# Patient Record
Sex: MALE | Race: BLACK OR AFRICAN AMERICAN | NOT HISPANIC OR LATINO | URBAN - METROPOLITAN AREA
[De-identification: names, ages, dates, MRNs, and addresses within clinical notes are randomized per-mention and may not be internally consistent; named-entity substitution may affect disease eponyms.]

---

## 2017-08-01 ENCOUNTER — EMERGENCY (EMERGENCY)
Facility: HOSPITAL | Age: 52
LOS: 1 days | Discharge: HOME | End: 2017-08-01
Attending: EMERGENCY MEDICINE | Admitting: EMERGENCY MEDICINE
Payer: SELF-PAY

## 2017-08-01 VITALS
TEMPERATURE: 98 F | OXYGEN SATURATION: 96 % | SYSTOLIC BLOOD PRESSURE: 137 MMHG | HEART RATE: 84 BPM | RESPIRATION RATE: 17 BRPM | DIASTOLIC BLOOD PRESSURE: 82 MMHG

## 2017-08-01 DIAGNOSIS — S83.92XA SPRAIN OF UNSPECIFIED SITE OF LEFT KNEE, INITIAL ENCOUNTER: ICD-10-CM
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
## 2017-08-01 DIAGNOSIS — M25.562 PAIN IN LEFT KNEE: ICD-10-CM

## 2017-08-01 PROCEDURE — 99283 EMERGENCY DEPT VISIT LOW MDM: CPT

## 2017-08-01 PROCEDURE — 73562 X-RAY EXAM OF KNEE 3: CPT | Mod: 26,LT

## 2017-08-01 NOTE — ED PROVIDER NOTE - MEDICAL DECISION MAKING DETAILS
knee sprain, Left, patient works for [employer], lifted heavy object, refer to ortho, knee immob, xray no acute fx noted on wet read

## 2017-11-29 ENCOUNTER — OUTPATIENT (OUTPATIENT)
Dept: OUTPATIENT SERVICES | Facility: HOSPITAL | Age: 52
LOS: 1 days | End: 2017-11-29
Payer: COMMERCIAL

## 2017-11-29 DIAGNOSIS — Z22.321 CARRIER OR SUSPECTED CARRIER OF METHICILLIN SUSCEPTIBLE STAPHYLOCOCCUS AUREUS: ICD-10-CM

## 2017-11-29 LAB
MRSA PCR RESULT.: NEGATIVE — SIGNIFICANT CHANGE UP
S AUREUS DNA NOSE QL NAA+PROBE: NEGATIVE — SIGNIFICANT CHANGE UP

## 2017-11-29 PROCEDURE — 87641 MR-STAPH DNA AMP PROBE: CPT

## 2017-12-08 VITALS
HEIGHT: 66 IN | HEART RATE: 72 BPM | SYSTOLIC BLOOD PRESSURE: 117 MMHG | TEMPERATURE: 98 F | WEIGHT: 192.02 LBS | DIASTOLIC BLOOD PRESSURE: 58 MMHG | RESPIRATION RATE: 18 BRPM | OXYGEN SATURATION: 100 %

## 2017-12-09 ENCOUNTER — INPATIENT (INPATIENT)
Facility: HOSPITAL | Age: 52
LOS: 1 days | Discharge: HOME HEALTH | DRG: 470 | End: 2017-12-11
Attending: ORTHOPAEDIC SURGERY | Admitting: ORTHOPAEDIC SURGERY
Payer: OTHER MISCELLANEOUS

## 2017-12-09 ENCOUNTER — RESULT REVIEW (OUTPATIENT)
Age: 52
End: 2017-12-09

## 2017-12-09 DIAGNOSIS — Z41.9 ENCOUNTER FOR PROCEDURE FOR PURPOSES OTHER THAN REMEDYING HEALTH STATE, UNSPECIFIED: Chronic | ICD-10-CM

## 2017-12-09 LAB
ALBUMIN SERPL ELPH-MCNC: 4.4 G/DL — SIGNIFICANT CHANGE UP (ref 3.3–5)
ALP SERPL-CCNC: 56 U/L — SIGNIFICANT CHANGE UP (ref 40–120)
ALT FLD-CCNC: 18 U/L — SIGNIFICANT CHANGE UP (ref 10–45)
ANION GAP SERPL CALC-SCNC: 11 MMOL/L — SIGNIFICANT CHANGE UP (ref 5–17)
APTT BLD: 29.2 SEC — SIGNIFICANT CHANGE UP (ref 27.5–37.4)
AST SERPL-CCNC: 15 U/L — SIGNIFICANT CHANGE UP (ref 10–40)
BASOPHILS NFR BLD AUTO: 0.2 % — SIGNIFICANT CHANGE UP (ref 0–2)
BILIRUB SERPL-MCNC: 0.3 MG/DL — SIGNIFICANT CHANGE UP (ref 0.2–1.2)
BLD GP AB SCN SERPL QL: NEGATIVE — SIGNIFICANT CHANGE UP
BUN SERPL-MCNC: 16 MG/DL — SIGNIFICANT CHANGE UP (ref 7–23)
CALCIUM SERPL-MCNC: 9.6 MG/DL — SIGNIFICANT CHANGE UP (ref 8.4–10.5)
CHLORIDE SERPL-SCNC: 101 MMOL/L — SIGNIFICANT CHANGE UP (ref 96–108)
CO2 SERPL-SCNC: 26 MMOL/L — SIGNIFICANT CHANGE UP (ref 22–31)
CREAT SERPL-MCNC: 0.92 MG/DL — SIGNIFICANT CHANGE UP (ref 0.5–1.3)
EOSINOPHIL NFR BLD AUTO: 1.4 % — SIGNIFICANT CHANGE UP (ref 0–6)
GLUCOSE BLDC GLUCOMTR-MCNC: 100 MG/DL — HIGH (ref 70–99)
GLUCOSE BLDC GLUCOMTR-MCNC: 122 MG/DL — HIGH (ref 70–99)
GLUCOSE BLDC GLUCOMTR-MCNC: 158 MG/DL — HIGH (ref 70–99)
GLUCOSE BLDC GLUCOMTR-MCNC: 162 MG/DL — HIGH (ref 70–99)
GLUCOSE BLDC GLUCOMTR-MCNC: 96 MG/DL — SIGNIFICANT CHANGE UP (ref 70–99)
GLUCOSE SERPL-MCNC: 140 MG/DL — HIGH (ref 70–99)
HCT VFR BLD CALC: 39 % — SIGNIFICANT CHANGE UP (ref 39–50)
HGB BLD-MCNC: 12.9 G/DL — LOW (ref 13–17)
INR BLD: 1.01 — SIGNIFICANT CHANGE UP (ref 0.88–1.16)
LYMPHOCYTES # BLD AUTO: 42.3 % — SIGNIFICANT CHANGE UP (ref 13–44)
MCHC RBC-ENTMCNC: 28.2 PG — SIGNIFICANT CHANGE UP (ref 27–34)
MCHC RBC-ENTMCNC: 33.1 G/DL — SIGNIFICANT CHANGE UP (ref 32–36)
MCV RBC AUTO: 85.2 FL — SIGNIFICANT CHANGE UP (ref 80–100)
MONOCYTES NFR BLD AUTO: 5.8 % — SIGNIFICANT CHANGE UP (ref 2–14)
NEUTROPHILS NFR BLD AUTO: 50.3 % — SIGNIFICANT CHANGE UP (ref 43–77)
PLATELET # BLD AUTO: 242 K/UL — SIGNIFICANT CHANGE UP (ref 150–400)
POTASSIUM SERPL-MCNC: 4.5 MMOL/L — SIGNIFICANT CHANGE UP (ref 3.5–5.3)
POTASSIUM SERPL-SCNC: 4.5 MMOL/L — SIGNIFICANT CHANGE UP (ref 3.5–5.3)
PROT SERPL-MCNC: 7.4 G/DL — SIGNIFICANT CHANGE UP (ref 6–8.3)
PROTHROM AB SERPL-ACNC: 11.2 SEC — SIGNIFICANT CHANGE UP (ref 9.8–12.7)
RBC # BLD: 4.58 M/UL — SIGNIFICANT CHANGE UP (ref 4.2–5.8)
RBC # FLD: 13.8 % — SIGNIFICANT CHANGE UP (ref 10.3–16.9)
RH IG SCN BLD-IMP: NEGATIVE — SIGNIFICANT CHANGE UP
SODIUM SERPL-SCNC: 138 MMOL/L — SIGNIFICANT CHANGE UP (ref 135–145)
WBC # BLD: 5.9 K/UL — SIGNIFICANT CHANGE UP (ref 3.8–10.5)
WBC # FLD AUTO: 5.9 K/UL — SIGNIFICANT CHANGE UP (ref 3.8–10.5)

## 2017-12-09 PROCEDURE — 73560 X-RAY EXAM OF KNEE 1 OR 2: CPT | Mod: 26,LT

## 2017-12-09 RX ORDER — ONDANSETRON 8 MG/1
4 TABLET, FILM COATED ORAL EVERY 6 HOURS
Qty: 0 | Refills: 0 | Status: DISCONTINUED | OUTPATIENT
Start: 2017-12-09 | End: 2017-12-11

## 2017-12-09 RX ORDER — DIAZEPAM 5 MG
5 TABLET ORAL EVERY 4 HOURS
Qty: 0 | Refills: 0 | Status: DISCONTINUED | OUTPATIENT
Start: 2017-12-09 | End: 2017-12-11

## 2017-12-09 RX ORDER — ACETAMINOPHEN 500 MG
1000 TABLET ORAL ONCE
Qty: 0 | Refills: 0 | Status: COMPLETED | OUTPATIENT
Start: 2017-12-09 | End: 2017-12-09

## 2017-12-09 RX ORDER — ACETAMINOPHEN 500 MG
650 TABLET ORAL EVERY 6 HOURS
Qty: 0 | Refills: 0 | Status: DISCONTINUED | OUTPATIENT
Start: 2017-12-09 | End: 2017-12-11

## 2017-12-09 RX ORDER — MORPHINE SULFATE 50 MG/1
4 CAPSULE, EXTENDED RELEASE ORAL ONCE
Qty: 0 | Refills: 0 | Status: DISCONTINUED | OUTPATIENT
Start: 2017-12-09 | End: 2017-12-09

## 2017-12-09 RX ORDER — POLYETHYLENE GLYCOL 3350 17 G/17G
17 POWDER, FOR SOLUTION ORAL DAILY
Qty: 0 | Refills: 0 | Status: DISCONTINUED | OUTPATIENT
Start: 2017-12-09 | End: 2017-12-11

## 2017-12-09 RX ORDER — HYDROMORPHONE HYDROCHLORIDE 2 MG/ML
1 INJECTION INTRAMUSCULAR; INTRAVENOUS; SUBCUTANEOUS ONCE
Qty: 0 | Refills: 0 | Status: DISCONTINUED | OUTPATIENT
Start: 2017-12-09 | End: 2017-12-09

## 2017-12-09 RX ORDER — SODIUM CHLORIDE 9 MG/ML
1000 INJECTION, SOLUTION INTRAVENOUS
Qty: 0 | Refills: 0 | Status: DISCONTINUED | OUTPATIENT
Start: 2017-12-09 | End: 2017-12-11

## 2017-12-09 RX ORDER — GLUCAGON INJECTION, SOLUTION 0.5 MG/.1ML
1 INJECTION, SOLUTION SUBCUTANEOUS ONCE
Qty: 0 | Refills: 0 | Status: DISCONTINUED | OUTPATIENT
Start: 2017-12-09 | End: 2017-12-11

## 2017-12-09 RX ORDER — INSULIN LISPRO 100/ML
VIAL (ML) SUBCUTANEOUS
Qty: 0 | Refills: 0 | Status: DISCONTINUED | OUTPATIENT
Start: 2017-12-09 | End: 2017-12-11

## 2017-12-09 RX ORDER — INSULIN GLARGINE 100 [IU]/ML
30 INJECTION, SOLUTION SUBCUTANEOUS AT BEDTIME
Qty: 0 | Refills: 0 | Status: DISCONTINUED | OUTPATIENT
Start: 2017-12-09 | End: 2017-12-11

## 2017-12-09 RX ORDER — OXYCODONE HYDROCHLORIDE 5 MG/1
10 TABLET ORAL EVERY 4 HOURS
Qty: 0 | Refills: 0 | Status: DISCONTINUED | OUTPATIENT
Start: 2017-12-09 | End: 2017-12-11

## 2017-12-09 RX ORDER — MORPHINE SULFATE 50 MG/1
2 CAPSULE, EXTENDED RELEASE ORAL EVERY 4 HOURS
Qty: 0 | Refills: 0 | Status: DISCONTINUED | OUTPATIENT
Start: 2017-12-09 | End: 2017-12-11

## 2017-12-09 RX ORDER — HYDROMORPHONE HYDROCHLORIDE 2 MG/ML
0.5 INJECTION INTRAMUSCULAR; INTRAVENOUS; SUBCUTANEOUS ONCE
Qty: 0 | Refills: 0 | Status: DISCONTINUED | OUTPATIENT
Start: 2017-12-09 | End: 2017-12-09

## 2017-12-09 RX ORDER — DEXTROSE 50 % IN WATER 50 %
25 SYRINGE (ML) INTRAVENOUS ONCE
Qty: 0 | Refills: 0 | Status: DISCONTINUED | OUTPATIENT
Start: 2017-12-09 | End: 2017-12-11

## 2017-12-09 RX ORDER — DEXTROSE 50 % IN WATER 50 %
12.5 SYRINGE (ML) INTRAVENOUS ONCE
Qty: 0 | Refills: 0 | Status: DISCONTINUED | OUTPATIENT
Start: 2017-12-09 | End: 2017-12-11

## 2017-12-09 RX ORDER — CELECOXIB 200 MG/1
200 CAPSULE ORAL
Qty: 0 | Refills: 0 | Status: DISCONTINUED | OUTPATIENT
Start: 2017-12-11 | End: 2017-12-11

## 2017-12-09 RX ORDER — ASPIRIN/CALCIUM CARB/MAGNESIUM 324 MG
325 TABLET ORAL
Qty: 0 | Refills: 0 | Status: DISCONTINUED | OUTPATIENT
Start: 2017-12-09 | End: 2017-12-11

## 2017-12-09 RX ORDER — SIMVASTATIN 20 MG/1
20 TABLET, FILM COATED ORAL AT BEDTIME
Qty: 0 | Refills: 0 | Status: DISCONTINUED | OUTPATIENT
Start: 2017-12-09 | End: 2017-12-11

## 2017-12-09 RX ORDER — SENNA PLUS 8.6 MG/1
2 TABLET ORAL AT BEDTIME
Qty: 0 | Refills: 0 | Status: DISCONTINUED | OUTPATIENT
Start: 2017-12-09 | End: 2017-12-11

## 2017-12-09 RX ORDER — MAGNESIUM HYDROXIDE 400 MG/1
30 TABLET, CHEWABLE ORAL DAILY
Qty: 0 | Refills: 0 | Status: DISCONTINUED | OUTPATIENT
Start: 2017-12-09 | End: 2017-12-11

## 2017-12-09 RX ORDER — METFORMIN HYDROCHLORIDE 850 MG/1
1000 TABLET ORAL
Qty: 0 | Refills: 0 | Status: DISCONTINUED | OUTPATIENT
Start: 2017-12-09 | End: 2017-12-09

## 2017-12-09 RX ORDER — BUPIVACAINE 13.3 MG/ML
20 INJECTION, SUSPENSION, LIPOSOMAL INFILTRATION ONCE
Qty: 0 | Refills: 0 | Status: DISCONTINUED | OUTPATIENT
Start: 2017-12-09 | End: 2017-12-11

## 2017-12-09 RX ORDER — OXYCODONE HYDROCHLORIDE 5 MG/1
5 TABLET ORAL EVERY 4 HOURS
Qty: 0 | Refills: 0 | Status: DISCONTINUED | OUTPATIENT
Start: 2017-12-09 | End: 2017-12-11

## 2017-12-09 RX ORDER — DOCUSATE SODIUM 100 MG
100 CAPSULE ORAL THREE TIMES A DAY
Qty: 0 | Refills: 0 | Status: DISCONTINUED | OUTPATIENT
Start: 2017-12-09 | End: 2017-12-11

## 2017-12-09 RX ORDER — CEFAZOLIN SODIUM 1 G
2000 VIAL (EA) INJECTION EVERY 8 HOURS
Qty: 0 | Refills: 0 | Status: COMPLETED | OUTPATIENT
Start: 2017-12-09 | End: 2017-12-09

## 2017-12-09 RX ORDER — OXYCODONE HYDROCHLORIDE 5 MG/1
10 TABLET ORAL EVERY 12 HOURS
Qty: 0 | Refills: 0 | Status: DISCONTINUED | OUTPATIENT
Start: 2017-12-09 | End: 2017-12-11

## 2017-12-09 RX ORDER — DEXTROSE 50 % IN WATER 50 %
1 SYRINGE (ML) INTRAVENOUS ONCE
Qty: 0 | Refills: 0 | Status: DISCONTINUED | OUTPATIENT
Start: 2017-12-09 | End: 2017-12-11

## 2017-12-09 RX ADMIN — Medication 400 MILLIGRAM(S): at 14:48

## 2017-12-09 RX ADMIN — MORPHINE SULFATE 4 MILLIGRAM(S): 50 CAPSULE, EXTENDED RELEASE ORAL at 14:04

## 2017-12-09 RX ADMIN — HYDROMORPHONE HYDROCHLORIDE 0.5 MILLIGRAM(S): 2 INJECTION INTRAMUSCULAR; INTRAVENOUS; SUBCUTANEOUS at 14:45

## 2017-12-09 RX ADMIN — Medication: at 17:18

## 2017-12-09 RX ADMIN — HYDROMORPHONE HYDROCHLORIDE 0.5 MILLIGRAM(S): 2 INJECTION INTRAMUSCULAR; INTRAVENOUS; SUBCUTANEOUS at 16:02

## 2017-12-09 RX ADMIN — HYDROMORPHONE HYDROCHLORIDE 0.5 MILLIGRAM(S): 2 INJECTION INTRAMUSCULAR; INTRAVENOUS; SUBCUTANEOUS at 14:55

## 2017-12-09 RX ADMIN — HYDROMORPHONE HYDROCHLORIDE 0.5 MILLIGRAM(S): 2 INJECTION INTRAMUSCULAR; INTRAVENOUS; SUBCUTANEOUS at 14:37

## 2017-12-09 RX ADMIN — Medication 100 MILLIGRAM(S): at 17:56

## 2017-12-09 RX ADMIN — Medication 100 MILLIGRAM(S): at 21:36

## 2017-12-09 RX ADMIN — HYDROMORPHONE HYDROCHLORIDE 0.5 MILLIGRAM(S): 2 INJECTION INTRAMUSCULAR; INTRAVENOUS; SUBCUTANEOUS at 15:19

## 2017-12-09 RX ADMIN — Medication 650 MILLIGRAM(S): at 23:11

## 2017-12-09 RX ADMIN — HYDROMORPHONE HYDROCHLORIDE 1 MILLIGRAM(S): 2 INJECTION INTRAMUSCULAR; INTRAVENOUS; SUBCUTANEOUS at 16:52

## 2017-12-09 RX ADMIN — OXYCODONE HYDROCHLORIDE 10 MILLIGRAM(S): 5 TABLET ORAL at 19:01

## 2017-12-09 RX ADMIN — Medication 650 MILLIGRAM(S): at 17:57

## 2017-12-09 RX ADMIN — Medication 650 MILLIGRAM(S): at 18:30

## 2017-12-09 RX ADMIN — Medication: at 12:24

## 2017-12-09 RX ADMIN — SIMVASTATIN 20 MILLIGRAM(S): 20 TABLET, FILM COATED ORAL at 21:36

## 2017-12-09 RX ADMIN — HYDROMORPHONE HYDROCHLORIDE 1 MILLIGRAM(S): 2 INJECTION INTRAMUSCULAR; INTRAVENOUS; SUBCUTANEOUS at 17:38

## 2017-12-09 RX ADMIN — SODIUM CHLORIDE 100 MILLILITER(S): 9 INJECTION, SOLUTION INTRAVENOUS at 12:45

## 2017-12-09 RX ADMIN — OXYCODONE HYDROCHLORIDE 10 MILLIGRAM(S): 5 TABLET ORAL at 17:52

## 2017-12-09 RX ADMIN — Medication 100 MILLIGRAM(S): at 23:12

## 2017-12-09 RX ADMIN — INSULIN GLARGINE 30 UNIT(S): 100 INJECTION, SOLUTION SUBCUTANEOUS at 21:37

## 2017-12-09 RX ADMIN — Medication 100 MILLIGRAM(S): at 16:13

## 2017-12-09 RX ADMIN — Medication 1000 MILLIGRAM(S): at 15:28

## 2017-12-09 RX ADMIN — Medication 650 MILLIGRAM(S): at 23:45

## 2017-12-09 RX ADMIN — Medication 5 MILLIGRAM(S): at 19:00

## 2017-12-09 RX ADMIN — Medication 2: at 21:36

## 2017-12-09 RX ADMIN — Medication 325 MILLIGRAM(S): at 17:56

## 2017-12-09 RX ADMIN — OXYCODONE HYDROCHLORIDE 10 MILLIGRAM(S): 5 TABLET ORAL at 21:47

## 2017-12-09 RX ADMIN — HYDROMORPHONE HYDROCHLORIDE 0.5 MILLIGRAM(S): 2 INJECTION INTRAMUSCULAR; INTRAVENOUS; SUBCUTANEOUS at 16:13

## 2017-12-09 RX ADMIN — OXYCODONE HYDROCHLORIDE 10 MILLIGRAM(S): 5 TABLET ORAL at 20:47

## 2017-12-09 RX ADMIN — MORPHINE SULFATE 4 MILLIGRAM(S): 50 CAPSULE, EXTENDED RELEASE ORAL at 14:16

## 2017-12-09 NOTE — PROGRESS NOTE ADULT - SUBJECTIVE AND OBJECTIVE BOX
Orthopaedics Post Op Check    Procedure: L TKA  Surgeon: Geremias    Pt comfortable, without complaints  Denies CP, SOB, N/V, numbness/tingling     Vital Signs Last 24 Hrs  T(C): 36.8 (09 Dec 2017 20:39), Max: 37.2 (09 Dec 2017 14:50)  T(F): 98.3 (09 Dec 2017 20:39), Max: 99 (09 Dec 2017 14:50)  HR: 79 (09 Dec 2017 20:39) (66 - 86)  BP: 150/74 (09 Dec 2017 20:39) (97/62 - 168/84)  BP(mean): 102 (09 Dec 2017 17:10) (76 - 117)  RR: 16 (09 Dec 2017 20:39) (10 - 27)  SpO2: 95% (09 Dec 2017 20:39) (95% - 100%)  AVSS, NAD    Dressing C/D/I  General: Pt Alert and oriented     Pulses: WWP, DP/PT 2+  Sensation: SILT  Motor: 5/5 EHL/FHL/TA/GS                          12.9   5.9   )-----------( 242      ( 09 Dec 2017 07:56 )             39.0   12-09    138  |  101  |  16  ----------------------------<  140<H>  4.5   |  26  |  0.92    Ca    9.6      09 Dec 2017 07:56    TPro  7.4  /  Alb  4.4  /  TBili  0.3  /  DBili  x   /  AST  15  /  ALT  18  /  AlkPhos  56  12-09    Post op XR: no fracture or foreign body    A/P: 52yMale POD#0 s/p above procedure  - Stable  - Pain Control  - DVT ppx: ASA  - Post op abx: Ancef  - PT, WBS: WBAT  - F/U AM Labs    Doni Colon MD, PGY-2  [phone]

## 2017-12-09 NOTE — PHYSICAL THERAPY INITIAL EVALUATION ADULT - ADDITIONAL COMMENTS
straight cane and rollator home, educated on the needs for rolling walker for safety, 15 lambert with right hand rail ascending lives with wife

## 2017-12-09 NOTE — H&P ADULT - PMH
Hyperlipidemia, unspecified hyperlipidemia type    Type 2 diabetes mellitus without complication, unspecified long term insulin use status

## 2017-12-09 NOTE — PHYSICAL THERAPY INITIAL EVALUATION ADULT - IMPAIRMENTS CONTRIBUTING TO GAIT DEVIATIONS, PT EVAL
narrow base of support/pain/decreased heel strike LLE ~20 degrees knee flexion, substituted by crouched posture when foot flat on the floor/decreased strength

## 2017-12-09 NOTE — PHYSICAL THERAPY INITIAL EVALUATION ADULT - CRITERIA FOR SKILLED THERAPEUTIC INTERVENTIONS
impairments found/rehab potential/anticipated equipment needs at discharge/anticipated discharge recommendation/functional limitations in following categories

## 2017-12-09 NOTE — H&P ADULT - HISTORY OF PRESENT ILLNESS
52 M with L knee pain attributable to OA. Patient sustained an injury on 08/01/17. Comes in today for elective L TKA.

## 2017-12-09 NOTE — PHYSICAL THERAPY INITIAL EVALUATION ADULT - PERTINENT HX OF CURRENT PROBLEM, REHAB EVAL
53 yo male with left knee Degenerative Joint Disease s/p elective left Total Knee Arthroplasty seen for PT eval POD # 0 for fast track

## 2017-12-09 NOTE — H&P ADULT - NSHPPHYSICALEXAM_GEN_ALL_CORE
LLE    WWP, BCR  DP/PT  Motor: EHL/FHL/GS/AT  Sensory: DP/SP/AT, MP/LP/S/S SILT  ROM: 0-95  Deformity: no obvious deformity

## 2017-12-09 NOTE — H&P ADULT - FAMILY HISTORY
Father  Still living? Unknown  Family history of diabetes mellitus, Age at diagnosis: Age Unknown     Mother  Still living? Unknown  Family history of chronic ischemic heart disease, Age at diagnosis: Age Unknown

## 2017-12-09 NOTE — BRIEF OPERATIVE NOTE - PROCEDURE
<<-----Click on this checkbox to enter Procedure Total knee arthroplasty  12/09/2017    Active  [facility]

## 2017-12-09 NOTE — PHYSICAL THERAPY INITIAL EVALUATION ADULT - PLANNED THERAPY INTERVENTIONS, PT EVAL
transfer training/postural re-education/stair negotiation assessment/ROM/gait training/strengthening/balance training/bed mobility training

## 2017-12-09 NOTE — PHYSICAL THERAPY INITIAL EVALUATION ADULT - RANGE OF MOTION EXAMINATION, REHAB EVAL
Left knee ~20 degrees-45 degrees limited by pain/Right LE ROM was WFL (within functional limits)/bilateral upper extremity ROM was WNL (within normal limits)

## 2017-12-10 LAB
ANION GAP SERPL CALC-SCNC: 13 MMOL/L — SIGNIFICANT CHANGE UP (ref 5–17)
BUN SERPL-MCNC: 11 MG/DL — SIGNIFICANT CHANGE UP (ref 7–23)
CALCIUM SERPL-MCNC: 8.9 MG/DL — SIGNIFICANT CHANGE UP (ref 8.4–10.5)
CHLORIDE SERPL-SCNC: 98 MMOL/L — SIGNIFICANT CHANGE UP (ref 96–108)
CO2 SERPL-SCNC: 25 MMOL/L — SIGNIFICANT CHANGE UP (ref 22–31)
CREAT SERPL-MCNC: 0.84 MG/DL — SIGNIFICANT CHANGE UP (ref 0.5–1.3)
GLUCOSE BLDC GLUCOMTR-MCNC: 142 MG/DL — HIGH (ref 70–99)
GLUCOSE BLDC GLUCOMTR-MCNC: 145 MG/DL — HIGH (ref 70–99)
GLUCOSE BLDC GLUCOMTR-MCNC: 180 MG/DL — HIGH (ref 70–99)
GLUCOSE BLDC GLUCOMTR-MCNC: 202 MG/DL — HIGH (ref 70–99)
GLUCOSE SERPL-MCNC: 137 MG/DL — HIGH (ref 70–99)
HCT VFR BLD CALC: 35.4 % — LOW (ref 39–50)
HGB BLD-MCNC: 11.4 G/DL — LOW (ref 13–17)
MCHC RBC-ENTMCNC: 27 PG — SIGNIFICANT CHANGE UP (ref 27–34)
MCHC RBC-ENTMCNC: 32.2 G/DL — SIGNIFICANT CHANGE UP (ref 32–36)
MCV RBC AUTO: 83.9 FL — SIGNIFICANT CHANGE UP (ref 80–100)
PLATELET # BLD AUTO: 232 K/UL — SIGNIFICANT CHANGE UP (ref 150–400)
POTASSIUM SERPL-MCNC: 3.9 MMOL/L — SIGNIFICANT CHANGE UP (ref 3.5–5.3)
POTASSIUM SERPL-SCNC: 3.9 MMOL/L — SIGNIFICANT CHANGE UP (ref 3.5–5.3)
RBC # BLD: 4.22 M/UL — SIGNIFICANT CHANGE UP (ref 4.2–5.8)
RBC # FLD: 13.6 % — SIGNIFICANT CHANGE UP (ref 10.3–16.9)
SODIUM SERPL-SCNC: 136 MMOL/L — SIGNIFICANT CHANGE UP (ref 135–145)
WBC # BLD: 7.3 K/UL — SIGNIFICANT CHANGE UP (ref 3.8–10.5)
WBC # FLD AUTO: 7.3 K/UL — SIGNIFICANT CHANGE UP (ref 3.8–10.5)

## 2017-12-10 RX ADMIN — INSULIN GLARGINE 30 UNIT(S): 100 INJECTION, SOLUTION SUBCUTANEOUS at 21:32

## 2017-12-10 RX ADMIN — Medication 4: at 21:31

## 2017-12-10 RX ADMIN — Medication 5 MILLIGRAM(S): at 17:36

## 2017-12-10 RX ADMIN — Medication 100 MILLIGRAM(S): at 21:31

## 2017-12-10 RX ADMIN — SIMVASTATIN 20 MILLIGRAM(S): 20 TABLET, FILM COATED ORAL at 21:31

## 2017-12-10 RX ADMIN — OXYCODONE HYDROCHLORIDE 10 MILLIGRAM(S): 5 TABLET ORAL at 19:30

## 2017-12-10 RX ADMIN — OXYCODONE HYDROCHLORIDE 10 MILLIGRAM(S): 5 TABLET ORAL at 16:23

## 2017-12-10 RX ADMIN — Medication 325 MILLIGRAM(S): at 17:34

## 2017-12-10 RX ADMIN — Medication 650 MILLIGRAM(S): at 13:20

## 2017-12-10 RX ADMIN — OXYCODONE HYDROCHLORIDE 10 MILLIGRAM(S): 5 TABLET ORAL at 08:48

## 2017-12-10 RX ADMIN — Medication 650 MILLIGRAM(S): at 17:35

## 2017-12-10 RX ADMIN — OXYCODONE HYDROCHLORIDE 10 MILLIGRAM(S): 5 TABLET ORAL at 00:03

## 2017-12-10 RX ADMIN — OXYCODONE HYDROCHLORIDE 10 MILLIGRAM(S): 5 TABLET ORAL at 06:00

## 2017-12-10 RX ADMIN — Medication 5 MILLIGRAM(S): at 13:19

## 2017-12-10 RX ADMIN — OXYCODONE HYDROCHLORIDE 10 MILLIGRAM(S): 5 TABLET ORAL at 21:46

## 2017-12-10 RX ADMIN — Medication 100 MILLIGRAM(S): at 13:20

## 2017-12-10 RX ADMIN — OXYCODONE HYDROCHLORIDE 10 MILLIGRAM(S): 5 TABLET ORAL at 01:03

## 2017-12-10 RX ADMIN — OXYCODONE HYDROCHLORIDE 10 MILLIGRAM(S): 5 TABLET ORAL at 12:50

## 2017-12-10 RX ADMIN — Medication 650 MILLIGRAM(S): at 17:34

## 2017-12-10 RX ADMIN — Medication 650 MILLIGRAM(S): at 12:31

## 2017-12-10 RX ADMIN — OXYCODONE HYDROCHLORIDE 10 MILLIGRAM(S): 5 TABLET ORAL at 19:05

## 2017-12-10 RX ADMIN — Medication 650 MILLIGRAM(S): at 07:00

## 2017-12-10 RX ADMIN — OXYCODONE HYDROCHLORIDE 10 MILLIGRAM(S): 5 TABLET ORAL at 04:10

## 2017-12-10 RX ADMIN — OXYCODONE HYDROCHLORIDE 10 MILLIGRAM(S): 5 TABLET ORAL at 16:50

## 2017-12-10 RX ADMIN — OXYCODONE HYDROCHLORIDE 10 MILLIGRAM(S): 5 TABLET ORAL at 22:46

## 2017-12-10 RX ADMIN — OXYCODONE HYDROCHLORIDE 10 MILLIGRAM(S): 5 TABLET ORAL at 12:28

## 2017-12-10 RX ADMIN — Medication 325 MILLIGRAM(S): at 06:00

## 2017-12-10 RX ADMIN — Medication 650 MILLIGRAM(S): at 06:00

## 2017-12-10 RX ADMIN — Medication 100 MILLIGRAM(S): at 06:00

## 2017-12-10 RX ADMIN — OXYCODONE HYDROCHLORIDE 10 MILLIGRAM(S): 5 TABLET ORAL at 09:15

## 2017-12-10 RX ADMIN — OXYCODONE HYDROCHLORIDE 10 MILLIGRAM(S): 5 TABLET ORAL at 05:10

## 2017-12-10 RX ADMIN — OXYCODONE HYDROCHLORIDE 10 MILLIGRAM(S): 5 TABLET ORAL at 07:00

## 2017-12-10 NOTE — PROGRESS NOTE ADULT - SUBJECTIVE AND OBJECTIVE BOX
Patient seen and examined at bedside.      S: No acute events overnight.   Pain tolerable.    Denies CP/SOB. Tolerating PO.  ROS unremarkable.    O: T(C): 36.7 (12-10-17 @ 05:46), Max: 37.2 (12-09-17 @ 14:50)  HR: 74 (12-10-17 @ 05:46) (66 - 86)  BP: 152/78 (12-10-17 @ 05:46) (97/62 - 168/84)  RR: 17 (12-10-17 @ 05:46) (10 - 27)  SpO2: 96% (12-10-17 @ 05:46) (95% - 100%)  Wt(kg): --    NAD, AOx3, non-labored respirations  Abd soft, NTND  Dressing CDI  Extremity soft, appropriate swelling and minimally TTP  foot warm and well perfused  SILT dP, sP, Sa, Mijares, T  firing TA/EHL/GS                          12.9   5.9   )-----------( 242      ( 09 Dec 2017 07:56 )             39.0       I&O's Detail    09 Dec 2017 07:01  -  10 Dec 2017 07:00  --------------------------------------------------------  IN:    IV PiggyBack: 50 mL    lactated ringers.: 2700 mL    Oral Fluid: 1080 mL  Total IN: 3830 mL    OUT:    Voided: 1600 mL  Total OUT: 1600 mL    Total NET: 2230 mL            A/P: 52y Male s/p L TKA, stable    - analgesia with bowel regimen  - WBAT with PT  - OOB ad broderick  - DVT ppx: aspirin enteric coated 325 milliGRAM(s) Oral two times a day  - ADAT  - f/u labs  - Dispo planning

## 2017-12-11 ENCOUNTER — TRANSCRIPTION ENCOUNTER (OUTPATIENT)
Age: 52
End: 2017-12-11

## 2017-12-11 VITALS
SYSTOLIC BLOOD PRESSURE: 126 MMHG | RESPIRATION RATE: 18 BRPM | OXYGEN SATURATION: 96 % | HEART RATE: 90 BPM | DIASTOLIC BLOOD PRESSURE: 82 MMHG | TEMPERATURE: 98 F

## 2017-12-11 LAB
GLUCOSE BLDC GLUCOMTR-MCNC: 124 MG/DL — HIGH (ref 70–99)
GLUCOSE BLDC GLUCOMTR-MCNC: 128 MG/DL — HIGH (ref 70–99)
GLUCOSE BLDC GLUCOMTR-MCNC: 172 MG/DL — HIGH (ref 70–99)

## 2017-12-11 RX ORDER — ASPIRIN/CALCIUM CARB/MAGNESIUM 324 MG
1 TABLET ORAL
Qty: 56 | Refills: 0
Start: 2017-12-11 | End: 2018-01-07

## 2017-12-11 RX ORDER — DOCUSATE SODIUM 100 MG
1 CAPSULE ORAL
Qty: 0 | Refills: 0 | COMMUNITY
Start: 2017-12-11

## 2017-12-11 RX ORDER — METFORMIN HYDROCHLORIDE 850 MG/1
1 TABLET ORAL
Qty: 0 | Refills: 0 | COMMUNITY

## 2017-12-11 RX ORDER — ASPIRIN/CALCIUM CARB/MAGNESIUM 324 MG
1 TABLET ORAL
Qty: 0 | Refills: 0 | COMMUNITY
Start: 2017-12-11

## 2017-12-11 RX ORDER — ASPIRIN/CALCIUM CARB/MAGNESIUM 324 MG
1 TABLET ORAL
Qty: 56 | Refills: 0 | OUTPATIENT
Start: 2017-12-11 | End: 2018-01-07

## 2017-12-11 RX ORDER — INSULIN GLARGINE 100 [IU]/ML
0 INJECTION, SOLUTION SUBCUTANEOUS
Qty: 0 | Refills: 0 | DISCHARGE
Start: 2017-12-11

## 2017-12-11 RX ORDER — CELECOXIB 200 MG/1
1 CAPSULE ORAL
Qty: 28 | Refills: 0 | OUTPATIENT
Start: 2017-12-11 | End: 2018-01-07

## 2017-12-11 RX ORDER — ASPIRIN/CALCIUM CARB/MAGNESIUM 324 MG
0 TABLET ORAL
Qty: 0 | Refills: 0 | DISCHARGE
Start: 2017-12-11 | End: 2018-01-07

## 2017-12-11 RX ORDER — CELECOXIB 200 MG/1
1 CAPSULE ORAL
Qty: 0 | Refills: 0 | COMMUNITY
Start: 2017-12-11

## 2017-12-11 RX ORDER — METFORMIN HYDROCHLORIDE 850 MG/1
1 TABLET ORAL
Qty: 0 | Refills: 0 | DISCHARGE
Start: 2017-12-11

## 2017-12-11 RX ORDER — CELECOXIB 200 MG/1
1 CAPSULE ORAL
Qty: 28 | Refills: 0
Start: 2017-12-11 | End: 2018-01-07

## 2017-12-11 RX ORDER — ASPIRIN/CALCIUM CARB/MAGNESIUM 324 MG
1 TABLET ORAL
Qty: 0 | Refills: 0 | DISCHARGE
Start: 2017-12-11 | End: 2018-01-07

## 2017-12-11 RX ORDER — INSULIN GLARGINE 100 [IU]/ML
0 INJECTION, SOLUTION SUBCUTANEOUS
Qty: 0 | Refills: 0 | COMMUNITY

## 2017-12-11 RX ORDER — ASPIRIN/CALCIUM CARB/MAGNESIUM 324 MG
1 TABLET ORAL
Qty: 0 | Refills: 0 | COMMUNITY

## 2017-12-11 RX ORDER — SENNA PLUS 8.6 MG/1
2 TABLET ORAL
Qty: 0 | Refills: 0 | DISCHARGE
Start: 2017-12-11

## 2017-12-11 RX ADMIN — Medication 650 MILLIGRAM(S): at 00:21

## 2017-12-11 RX ADMIN — OXYCODONE HYDROCHLORIDE 10 MILLIGRAM(S): 5 TABLET ORAL at 05:22

## 2017-12-11 RX ADMIN — POLYETHYLENE GLYCOL 3350 17 GRAM(S): 17 POWDER, FOR SOLUTION ORAL at 12:21

## 2017-12-11 RX ADMIN — OXYCODONE HYDROCHLORIDE 10 MILLIGRAM(S): 5 TABLET ORAL at 16:14

## 2017-12-11 RX ADMIN — CELECOXIB 200 MILLIGRAM(S): 200 CAPSULE ORAL at 10:17

## 2017-12-11 RX ADMIN — Medication 650 MILLIGRAM(S): at 12:20

## 2017-12-11 RX ADMIN — OXYCODONE HYDROCHLORIDE 10 MILLIGRAM(S): 5 TABLET ORAL at 10:22

## 2017-12-11 RX ADMIN — OXYCODONE HYDROCHLORIDE 10 MILLIGRAM(S): 5 TABLET ORAL at 15:14

## 2017-12-11 RX ADMIN — Medication 2: at 12:20

## 2017-12-11 RX ADMIN — CELECOXIB 200 MILLIGRAM(S): 200 CAPSULE ORAL at 09:22

## 2017-12-11 RX ADMIN — Medication 650 MILLIGRAM(S): at 08:35

## 2017-12-11 RX ADMIN — Medication 650 MILLIGRAM(S): at 13:20

## 2017-12-11 RX ADMIN — Medication 650 MILLIGRAM(S): at 05:22

## 2017-12-11 RX ADMIN — Medication 325 MILLIGRAM(S): at 05:22

## 2017-12-11 RX ADMIN — Medication 5 MILLIGRAM(S): at 00:22

## 2017-12-11 RX ADMIN — OXYCODONE HYDROCHLORIDE 10 MILLIGRAM(S): 5 TABLET ORAL at 09:22

## 2017-12-11 RX ADMIN — OXYCODONE HYDROCHLORIDE 10 MILLIGRAM(S): 5 TABLET ORAL at 06:22

## 2017-12-11 RX ADMIN — Medication 100 MILLIGRAM(S): at 05:22

## 2017-12-11 RX ADMIN — Medication 650 MILLIGRAM(S): at 01:21

## 2017-12-11 NOTE — DISCHARGE NOTE ADULT - MEDICATION SUMMARY - MEDICATIONS TO STOP TAKING
I will STOP taking the medications listed below when I get home from the hospital:    varenicline 1 mg oral tablet  -- 1 tab(s) by mouth 2 times a day

## 2017-12-11 NOTE — DISCHARGE NOTE ADULT - HOSPITAL COURSE
Admitted  Surgery L TKA 12/9/17  Marlena-op Antibiotics  Pain control  DVT prophylaxis  OOB/Physical Therapy

## 2017-12-11 NOTE — DISCHARGE NOTE ADULT - PATIENT PORTAL LINK FT
“You can access the Follow[facility] Patient Portal, offered by [facility], by registering with the following website: [email or web address]”

## 2017-12-11 NOTE — DISCHARGE NOTE ADULT - MEDICATION SUMMARY - MEDICATIONS TO TAKE
I will START or STAY ON the medications listed below when I get home from the hospital:    oxyCODONE-acetaminophen 5 mg-325 mg oral tablet  -- 1-2 tab(s) by mouth every 4-6 hours, As Needed -for moderate pain MDD:8  -- Caution federal law prohibits the transfer of this drug to any person other  than the person for whom it was prescribed.  May cause drowsiness.  Alcohol may intensify this effect.  Use care when operating dangerous machinery.  This prescription cannot be refilled.  This product contains acetaminophen.  Do not use  with any other product containing acetaminophen to prevent possible liver damage.  Using more of this medication than prescribed may cause serious breathing problems.    -- Indication: For Pain    aspirin 325 mg oral delayed release tablet  -- 1 tab(s) by mouth 2 times a day  -- Indication: For Clot prevention    celecoxib 200 mg oral capsule  -- 1 cap(s) by mouth once a day (before a meal)  -- Indication: For Inflammation    glipiZIDE 10 mg oral tablet  -- 1 tab(s) by mouth once a day  -- Indication: For Diabetes    insulin glargine 100 units/mL subcutaneous solution  --  subcutaneous   -- Indication: For Diabetes    Glucophage 1000 mg oral tablet  -- 1 tab(s) by mouth 2 times a day  -- Indication: For Diabetes    simvastatin 20 mg oral tablet  -- 1 tab(s) by mouth once a day (at bedtime)  -- Indication: For Cholesterol    docusate sodium 100 mg oral capsule  -- 1 cap(s) by mouth 3 times a day  -- Indication: For Constipation (OTC)    senna oral tablet  -- 2 tab(s) by mouth once a day (at bedtime), As needed, Constipation  -- Indication: For Constipation (OTC) I will START or STAY ON the medications listed below when I get home from the hospital:    celecoxib 200 mg oral capsule  -- 1 cap(s) by mouth once a day (before a meal)  -- Indication: For Inflammation    oxyCODONE-acetaminophen 5 mg-325 mg oral tablet  -- 1-2 tab(s) by mouth every 4-6 hours, As Needed -for moderate pain MDD:8  -- Caution federal law prohibits the transfer of this drug to any person other  than the person for whom it was prescribed.  May cause drowsiness.  Alcohol may intensify this effect.  Use care when operating dangerous machinery.  This prescription cannot be refilled.  This product contains acetaminophen.  Do not use  with any other product containing acetaminophen to prevent possible liver damage.  Using more of this medication than prescribed may cause serious breathing problems.    -- Indication: For Pain    aspirin 325 mg oral delayed release tablet  -- 1 tab(s) by mouth 2 times a day  -- Indication: For Clot prevention    glipiZIDE 10 mg oral tablet  -- 1 tab(s) by mouth once a day  -- Indication: For Diabetes    insulin glargine 100 units/mL subcutaneous solution  --  subcutaneous   -- Indication: For Diabetes    Glucophage 1000 mg oral tablet  -- 1 tab(s) by mouth 2 times a day  -- Indication: For Diabetes    simvastatin 20 mg oral tablet  -- 1 tab(s) by mouth once a day (at bedtime)  -- Indication: For Cholesterol    docusate sodium 100 mg oral capsule  -- 1 cap(s) by mouth 3 times a day  -- Indication: For Constipation (OTC)    senna oral tablet  -- 2 tab(s) by mouth once a day (at bedtime), As needed, Constipation  -- Indication: For Constipation (OTC)

## 2017-12-11 NOTE — DISCHARGE NOTE ADULT - ADDITIONAL INSTRUCTIONS
No strenuous activity, heavy lifting, driving or returning to work until cleared by MD.  You may shower - dressing is water-resistant, no soaking in bathtubs.  Remove dressing after post op day 5-7, then leave incision open to air. Keep incision clean and dry.  Try to have regular bowel movements, take stool softener or laxative if necessary.  May take Pepcid or Zantac for upset stomach.  May take Aleve or Naproxen instead of Meloxicam.  Swelling may travel all the way down leg to foot, this is normal and will subside in a few weeks.  Call to schedule an appt with Dr. Archuleta for follow up, if you have staples or sutures they will be removed in office.  Contact your doctor if you experience: fever greater than 101.5, chills, chest pain, difficulty breathing, redness or excessive drainage around the incision, other concerns.   Follow up with your primary care provider.

## 2017-12-11 NOTE — DISCHARGE NOTE ADULT - CARE PLAN
Principal Discharge DX:	Primary osteoarthritis of left knee  Goal:	Improvement after surgery.  Instructions for follow-up, activity and diet:	See below.

## 2017-12-11 NOTE — PROGRESS NOTE ADULT - SUBJECTIVE AND OBJECTIVE BOX
Patient seen and examined at bedside.      S: No acute events overnight.   Pain tolerable.    Denies CP/SOB. Tolerating PO.  ROS unremarkable.    O: T(C): 37.1 (12-11-17 @ 05:19), Max: 37.2 (12-10-17 @ 15:33)  HR: 80 (12-11-17 @ 05:19) (67 - 91)  BP: 133/79 (12-11-17 @ 05:19) (133/79 - 164/88)  RR: 16 (12-11-17 @ 05:19) (16 - 16)  SpO2: 98% (12-11-17 @ 05:19) (96% - 98%)  Wt(kg): --    NAD, AOx3, non-labored respirations  Abd soft, NTND  Dressing CDI  Extremity soft, appropriate swelling and minimally TTP  foot warm and well perfused  SILT dP, sP, Sa, Mijares, T  firing TA/EHL/GS                          11.4   7.3   )-----------( 232      ( 10 Dec 2017 08:31 )             35.4       I&O's Detail    10 Dec 2017 07:01  -  11 Dec 2017 07:00  --------------------------------------------------------  IN:    Oral Fluid: 720 mL  Total IN: 720 mL    OUT:    Voided: 3100 mL  Total OUT: 3100 mL    Total NET: -2380 mL            A/P: 52y Male s/p L TKA, stable, for home    - analgesia with bowel regimen  - WBAT with PT  - OOB ad broderick  - DVT ppx: aspirin enteric coated 325 milliGRAM(s) Oral two times a day  - ADAT  - f/u labs  - Dispo planning

## 2017-12-11 NOTE — DISCHARGE NOTE ADULT - CARE PROVIDER_API CALL
Po Archuleta), Orthopaedic Surgery  [address] [place] [address]  Phone: [phone]  Fax: [phone]

## 2017-12-12 LAB — SURGICAL PATHOLOGY STUDY: SIGNIFICANT CHANGE UP

## 2017-12-12 PROCEDURE — 97116 GAIT TRAINING THERAPY: CPT

## 2017-12-12 PROCEDURE — 88300 SURGICAL PATH GROSS: CPT

## 2017-12-12 PROCEDURE — C1776: CPT

## 2017-12-12 PROCEDURE — 86850 RBC ANTIBODY SCREEN: CPT

## 2017-12-12 PROCEDURE — 85610 PROTHROMBIN TIME: CPT

## 2017-12-12 PROCEDURE — 86900 BLOOD TYPING SEROLOGIC ABO: CPT

## 2017-12-12 PROCEDURE — 86901 BLOOD TYPING SEROLOGIC RH(D): CPT

## 2017-12-12 PROCEDURE — 85025 COMPLETE CBC W/AUTO DIFF WBC: CPT

## 2017-12-12 PROCEDURE — 82962 GLUCOSE BLOOD TEST: CPT

## 2017-12-12 PROCEDURE — 73560 X-RAY EXAM OF KNEE 1 OR 2: CPT

## 2017-12-12 PROCEDURE — 86923 COMPATIBILITY TEST ELECTRIC: CPT

## 2017-12-12 PROCEDURE — C1713: CPT

## 2017-12-12 PROCEDURE — 97110 THERAPEUTIC EXERCISES: CPT

## 2017-12-12 PROCEDURE — 36415 COLL VENOUS BLD VENIPUNCTURE: CPT

## 2017-12-12 PROCEDURE — 80048 BASIC METABOLIC PNL TOTAL CA: CPT

## 2017-12-12 PROCEDURE — 85730 THROMBOPLASTIN TIME PARTIAL: CPT

## 2017-12-12 PROCEDURE — 97161 PT EVAL LOW COMPLEX 20 MIN: CPT

## 2017-12-12 PROCEDURE — 85027 COMPLETE CBC AUTOMATED: CPT

## 2017-12-12 PROCEDURE — 80053 COMPREHEN METABOLIC PANEL: CPT

## 2017-12-13 DIAGNOSIS — Z72.0 TOBACCO USE: ICD-10-CM

## 2017-12-13 DIAGNOSIS — Z79.4 LONG TERM (CURRENT) USE OF INSULIN: ICD-10-CM

## 2017-12-13 DIAGNOSIS — E11.9 TYPE 2 DIABETES MELLITUS WITHOUT COMPLICATIONS: ICD-10-CM

## 2017-12-13 DIAGNOSIS — Z79.82 LONG TERM (CURRENT) USE OF ASPIRIN: ICD-10-CM

## 2017-12-13 DIAGNOSIS — E78.5 HYPERLIPIDEMIA, UNSPECIFIED: ICD-10-CM

## 2017-12-13 DIAGNOSIS — Z85.46 PERSONAL HISTORY OF MALIGNANT NEOPLASM OF PROSTATE: ICD-10-CM

## 2017-12-13 DIAGNOSIS — M17.12 UNILATERAL PRIMARY OSTEOARTHRITIS, LEFT KNEE: ICD-10-CM

## 2017-12-13 DIAGNOSIS — Z91.013 ALLERGY TO SEAFOOD: ICD-10-CM

## 2017-12-13 DIAGNOSIS — F14.99 COCAINE USE, UNSPECIFIED WITH UNSPECIFIED COCAINE-INDUCED DISORDER: ICD-10-CM

## 2017-12-13 DIAGNOSIS — Z98.890 OTHER SPECIFIED POSTPROCEDURAL STATES: ICD-10-CM

## 2017-12-13 DIAGNOSIS — Z28.21 IMMUNIZATION NOT CARRIED OUT BECAUSE OF PATIENT REFUSAL: ICD-10-CM

## 2018-02-15 PROBLEM — Z00.00 ENCOUNTER FOR PREVENTIVE HEALTH EXAMINATION: Status: ACTIVE | Noted: 2018-02-15

## 2018-02-16 ENCOUNTER — OUTPATIENT (OUTPATIENT)
Dept: OUTPATIENT SERVICES | Facility: HOSPITAL | Age: 53
LOS: 1 days | End: 2018-02-16
Payer: OTHER MISCELLANEOUS

## 2018-02-16 ENCOUNTER — APPOINTMENT (OUTPATIENT)
Dept: SURGERY | Facility: CLINIC | Age: 53
End: 2018-02-16

## 2018-02-16 DIAGNOSIS — Z01.818 ENCOUNTER FOR OTHER PREPROCEDURAL EXAMINATION: ICD-10-CM

## 2018-02-16 DIAGNOSIS — Z96.652 PRESENCE OF LEFT ARTIFICIAL KNEE JOINT: ICD-10-CM

## 2018-02-16 DIAGNOSIS — Z41.9 ENCOUNTER FOR PROCEDURE FOR PURPOSES OTHER THAN REMEDYING HEALTH STATE, UNSPECIFIED: Chronic | ICD-10-CM

## 2018-02-16 DIAGNOSIS — M25.662 STIFFNESS OF LEFT KNEE, NOT ELSEWHERE CLASSIFIED: ICD-10-CM

## 2018-02-16 PROCEDURE — 93010 ELECTROCARDIOGRAM REPORT: CPT

## 2018-02-19 VITALS
HEIGHT: 67 IN | OXYGEN SATURATION: 100 % | TEMPERATURE: 97 F | HEART RATE: 85 BPM | WEIGHT: 193.35 LBS | SYSTOLIC BLOOD PRESSURE: 131 MMHG | DIASTOLIC BLOOD PRESSURE: 62 MMHG | RESPIRATION RATE: 16 BRPM

## 2018-02-19 NOTE — PATIENT PROFILE ADULT. - PSH
Surgery, elective  left knee meniscus  Surgery, elective  prostate 2013 H/O total knee replacement, left  Dec. 2017  Surgery, elective  left knee meniscus  Surgery, elective  prostate 2013

## 2018-02-20 ENCOUNTER — OUTPATIENT (OUTPATIENT)
Dept: OUTPATIENT SERVICES | Facility: HOSPITAL | Age: 53
LOS: 1 days | Discharge: HOME | End: 2018-02-20
Payer: OTHER MISCELLANEOUS

## 2018-02-20 ENCOUNTER — APPOINTMENT (OUTPATIENT)
Dept: SURGERY | Facility: CLINIC | Age: 53
End: 2018-02-20

## 2018-02-20 VITALS
TEMPERATURE: 97 F | SYSTOLIC BLOOD PRESSURE: 145 MMHG | OXYGEN SATURATION: 100 % | RESPIRATION RATE: 11 BRPM | HEART RATE: 82 BPM | DIASTOLIC BLOOD PRESSURE: 74 MMHG

## 2018-02-20 DIAGNOSIS — Z96.652 PRESENCE OF LEFT ARTIFICIAL KNEE JOINT: Chronic | ICD-10-CM

## 2018-02-20 DIAGNOSIS — Z41.9 ENCOUNTER FOR PROCEDURE FOR PURPOSES OTHER THAN REMEDYING HEALTH STATE, UNSPECIFIED: Chronic | ICD-10-CM

## 2018-02-20 LAB — GLUCOSE BLDC GLUCOMTR-MCNC: 131 MG/DL — HIGH (ref 70–99)

## 2018-02-20 PROCEDURE — 27570 FIXATION OF KNEE JOINT: CPT | Mod: LT

## 2018-02-20 PROCEDURE — 82962 GLUCOSE BLOOD TEST: CPT

## 2018-07-09 NOTE — ED ADULT NURSE NOTE - CAS ELECT INFOMATION PROVIDED
Mobs            []Home TENS  [x]Iontophoresis    []Orthotic casting/fitting      []Dry Needling             Electronically signed by: Ernst Flores PT, DPT    Date: 7/9/2018      ______________________________________ Date: 7/9/2018   Physician Signature
DC instructions

## 2018-12-06 NOTE — PATIENT PROFILE ADULT. - AS SC BRADEN FRICTION
Occusoft lid scrubs QHS. Importance of removing all makeup. Hot compresses followed by gentle lid massage. (2) potential problem

## 2019-08-15 NOTE — PATIENT PROFILE ADULT. - NS PRO OT REFERRAL QUES 1 YN
Called and Left Message for Ree at [facility] [phone] to cancel appoitment with Adriana Winters for 08-19-19 @ 9:30am because he was just seen by Amanda Jones in the office and is  Not due back until November. Cancelled [facility]a [facility].      Thanks,        Marbella   no

## 2019-11-25 PROBLEM — E11.9 TYPE 2 DIABETES MELLITUS WITHOUT COMPLICATIONS: Chronic | Status: ACTIVE | Noted: 2017-12-09

## 2019-11-25 PROBLEM — E78.5 HYPERLIPIDEMIA, UNSPECIFIED: Chronic | Status: ACTIVE | Noted: 2017-12-09

## 2020-08-12 NOTE — PHYSICAL THERAPY INITIAL EVALUATION ADULT - REHAB POTENTIAL, PT EVAL
Anesthesia Post Evaluation    Patient: Sammi Abreu    Procedure(s) Performed: Procedure(s) (LRB):  EGD (ESOPHAGOGASTRODUODENOSCOPY) (N/A)    Final Anesthesia Type: general    Patient location during evaluation: PACU  Patient participation: Yes- Able to Participate  Level of consciousness: awake and alert  Post-procedure vital signs: reviewed and stable  Pain management: adequate  Airway patency: patent    PONV status at discharge: No PONV  Anesthetic complications: no      Cardiovascular status: hemodynamically stable  Respiratory status: unassisted and room air  Hydration status: euvolemic  Follow-up not needed.          Vitals Value Taken Time   /76 08/12/20 1208   Temp 37.3 °C (99.1 °F) 08/12/20 1151   Pulse 84 08/12/20 1210   Resp 16 08/12/20 1206   SpO2 99 % 08/12/20 1210   Vitals shown include unvalidated device data.      Event Time   Out of Recovery 08/12/2020 12:15:22         Pain/Don Score: Don Score: 9 (8/12/2020 12:05 PM)         good, to achieve stated therapy goals

## 2020-10-05 NOTE — H&P ADULT - NSICDXPASTMEDICALHX_GEN_ALL_CORE_FT
PAST MEDICAL HISTORY:  Hyperlipidemia, unspecified hyperlipidemia type     Osteoarthritis right hip    Type 2 diabetes mellitus without complication, unspecified long term insulin use status      PAST MEDICAL HISTORY:  HTN (hypertension)     Hyperlipidemia, unspecified hyperlipidemia type     Osteoarthritis right hip  work related injury    Prostate cancer     Type 2 diabetes mellitus without complication, unspecified long term insulin use status

## 2020-10-05 NOTE — H&P ADULT - NSHPPHYSICALEXAM_GEN_ALL_CORE
MSK:  decreased ROM right hip 2/2 pain  Remainder of physical exam as per medical clearance note Gen:  56 y/o male, well nourished, well developed, NAD  MSK: Decreased ROM secondary to pain at the right hip   calves soft, nontender bilaterally   sensation intact to light touch bilateral lower extremities  DP 2+,  brisk capillary refill  EHL/FHL/TA/GS 5/5 bilaterally     Rest of PE per MD clearance

## 2020-10-05 NOTE — H&P ADULT - NSHPLABSRESULTS_GEN_ALL_CORE
Povidone iodine nasal swab to be given day of surgery COVID neg 10/10 COVID neg 10/10  Preop CBC, BMP, UA WNL  Preop EKG WNL per clearance  Preop A1C 7.9 on 09/12/2020  3M DOS  PT, INR, PTT DOS

## 2020-10-05 NOTE — PATIENT PROFILE ADULT - LONGEST PERIOD TOBACCO-FREE
smoker for on and off for 15 yrs, 1 pack a day and trying to cut down currently 1 cigarettes a day on Chantix

## 2020-10-05 NOTE — H&P ADULT - PROBLEM SELECTOR PLAN 1
Admit to Orthopaedic Service.  Presents today for elective right THR   Pt medically stable and cleared for procedure today by  Admit to Orthopaedic Service.  Presents today for elective right THR   Pt medically stable and cleared for procedure today by JASBIR Reid Admit to Orthopaedic Service.  Presents today for elective right total hip replacement   Pt medically stable and cleared for procedure today by JASBIR Reid

## 2020-10-05 NOTE — H&P ADULT - NSICDXPASTSURGICALHX_GEN_ALL_CORE_FT
PAST SURGICAL HISTORY:  H/O total knee replacement, left Dec. 2017    Surgery, elective prostate 2013    Surgery, elective left knee meniscus     PAST SURGICAL HISTORY:  H/O total knee replacement, left Dec. 2017    Surgery, elective prostatectomy,  2013    Surgery, elective left knee meniscus

## 2020-10-05 NOTE — H&P ADULT - HISTORY OF PRESENT ILLNESS
55 year old male presents with right hip pain x   Presents for elective right total hip replacement 55 year old male presents with right hip pain x over 1 year. Patient denies known injury. Reports right hip pain developed gradually. Patient states he underwent L TKA 1 year ago with Dr. Archuleta and during recovery is when he began to feel worsening pain right hip. Patient using cane for assistance. Denies numbness/tingling. Patient reports failure of conservative management including activity modification, oral analgesics. Following review of the risks and benefits of the procedure, patient presents for elective right total hip replacement with Dr. Archuleta.

## 2020-10-05 NOTE — H&P ADULT - NSICDXFAMILYHX_GEN_ALL_CORE_FT
FAMILY HISTORY:  Father  Still living? Unknown  Family history of diabetes mellitus, Age at diagnosis: Age Unknown    Mother  Still living? Unknown  Family history of chronic ischemic heart disease, Age at diagnosis: Age Unknown

## 2020-10-12 ENCOUNTER — TRANSCRIPTION ENCOUNTER (OUTPATIENT)
Age: 55
End: 2020-10-12

## 2020-10-12 VITALS
RESPIRATION RATE: 18 BRPM | HEIGHT: 67 IN | HEART RATE: 83 BPM | SYSTOLIC BLOOD PRESSURE: 128 MMHG | TEMPERATURE: 97 F | OXYGEN SATURATION: 99 % | WEIGHT: 194.67 LBS | DIASTOLIC BLOOD PRESSURE: 78 MMHG

## 2020-10-12 RX ORDER — POVIDONE-IODINE 5 %
1 AEROSOL (ML) TOPICAL ONCE
Refills: 0 | Status: COMPLETED | OUTPATIENT
Start: 2020-10-13 | End: 2020-10-13

## 2020-10-12 RX ORDER — INFLUENZA VIRUS VACCINE 15; 15; 15; 15 UG/.5ML; UG/.5ML; UG/.5ML; UG/.5ML
0.5 SUSPENSION INTRAMUSCULAR ONCE
Refills: 0 | Status: DISCONTINUED | OUTPATIENT
Start: 2020-10-13 | End: 2020-10-14

## 2020-10-12 RX ORDER — SIMVASTATIN 20 MG/1
1 TABLET, FILM COATED ORAL
Qty: 0 | Refills: 0 | DISCHARGE

## 2020-10-13 ENCOUNTER — INPATIENT (INPATIENT)
Facility: HOSPITAL | Age: 55
LOS: 0 days | Discharge: HOME HEALTH | DRG: 470 | End: 2020-10-14
Attending: ORTHOPAEDIC SURGERY | Admitting: ORTHOPAEDIC SURGERY
Payer: OTHER MISCELLANEOUS

## 2020-10-13 DIAGNOSIS — E11.9 TYPE 2 DIABETES MELLITUS WITHOUT COMPLICATIONS: ICD-10-CM

## 2020-10-13 DIAGNOSIS — E78.5 HYPERLIPIDEMIA, UNSPECIFIED: ICD-10-CM

## 2020-10-13 DIAGNOSIS — C61 MALIGNANT NEOPLASM OF PROSTATE: ICD-10-CM

## 2020-10-13 DIAGNOSIS — Z41.9 ENCOUNTER FOR PROCEDURE FOR PURPOSES OTHER THAN REMEDYING HEALTH STATE, UNSPECIFIED: Chronic | ICD-10-CM

## 2020-10-13 DIAGNOSIS — M16.11 UNILATERAL PRIMARY OSTEOARTHRITIS, RIGHT HIP: ICD-10-CM

## 2020-10-13 DIAGNOSIS — Z96.652 PRESENCE OF LEFT ARTIFICIAL KNEE JOINT: Chronic | ICD-10-CM

## 2020-10-13 DIAGNOSIS — I10 ESSENTIAL (PRIMARY) HYPERTENSION: ICD-10-CM

## 2020-10-13 LAB
AMPHET UR-MCNC: NEGATIVE — SIGNIFICANT CHANGE UP
APTT BLD: 28.8 SEC — SIGNIFICANT CHANGE UP (ref 27.5–35.5)
BARBITURATES UR SCN-MCNC: NEGATIVE — SIGNIFICANT CHANGE UP
BENZODIAZ UR-MCNC: POSITIVE
COCAINE METAB.OTHER UR-MCNC: POSITIVE
INR BLD: 0.97 — SIGNIFICANT CHANGE UP (ref 0.88–1.16)
METHADONE UR-MCNC: POSITIVE
OPIATES UR-MCNC: POSITIVE
PCP SPEC-MCNC: SIGNIFICANT CHANGE UP
PCP UR-MCNC: NEGATIVE — SIGNIFICANT CHANGE UP
PROTHROM AB SERPL-ACNC: 11.6 SEC — SIGNIFICANT CHANGE UP (ref 10.6–13.6)
THC UR QL: POSITIVE

## 2020-10-13 PROCEDURE — 72170 X-RAY EXAM OF PELVIS: CPT | Mod: 26

## 2020-10-13 RX ORDER — DEXTROSE 50 % IN WATER 50 %
25 SYRINGE (ML) INTRAVENOUS ONCE
Refills: 0 | Status: DISCONTINUED | OUTPATIENT
Start: 2020-10-13 | End: 2020-10-14

## 2020-10-13 RX ORDER — DEXTROSE 50 % IN WATER 50 %
12.5 SYRINGE (ML) INTRAVENOUS ONCE
Refills: 0 | Status: DISCONTINUED | OUTPATIENT
Start: 2020-10-13 | End: 2020-10-14

## 2020-10-13 RX ORDER — KETOROLAC TROMETHAMINE 30 MG/ML
15 SYRINGE (ML) INJECTION EVERY 6 HOURS
Refills: 0 | Status: DISCONTINUED | OUTPATIENT
Start: 2020-10-13 | End: 2020-10-14

## 2020-10-13 RX ORDER — CELECOXIB 200 MG/1
400 CAPSULE ORAL ONCE
Refills: 0 | Status: COMPLETED | OUTPATIENT
Start: 2020-10-13 | End: 2020-10-13

## 2020-10-13 RX ORDER — HYDROMORPHONE HYDROCHLORIDE 2 MG/ML
1 INJECTION INTRAMUSCULAR; INTRAVENOUS; SUBCUTANEOUS
Refills: 0 | Status: DISCONTINUED | OUTPATIENT
Start: 2020-10-13 | End: 2020-10-14

## 2020-10-13 RX ORDER — CHLORHEXIDINE GLUCONATE 213 G/1000ML
1 SOLUTION TOPICAL ONCE
Refills: 0 | Status: COMPLETED | OUTPATIENT
Start: 2020-10-13 | End: 2020-10-13

## 2020-10-13 RX ORDER — LISINOPRIL 2.5 MG/1
20 TABLET ORAL DAILY
Refills: 0 | Status: DISCONTINUED | OUTPATIENT
Start: 2020-10-14 | End: 2020-10-14

## 2020-10-13 RX ORDER — ONDANSETRON 8 MG/1
4 TABLET, FILM COATED ORAL EVERY 6 HOURS
Refills: 0 | Status: DISCONTINUED | OUTPATIENT
Start: 2020-10-13 | End: 2020-10-14

## 2020-10-13 RX ORDER — ATORVASTATIN CALCIUM 80 MG/1
40 TABLET, FILM COATED ORAL AT BEDTIME
Refills: 0 | Status: DISCONTINUED | OUTPATIENT
Start: 2020-10-13 | End: 2020-10-14

## 2020-10-13 RX ORDER — CYCLOBENZAPRINE HYDROCHLORIDE 10 MG/1
5 TABLET, FILM COATED ORAL THREE TIMES A DAY
Refills: 0 | Status: DISCONTINUED | OUTPATIENT
Start: 2020-10-13 | End: 2020-10-14

## 2020-10-13 RX ORDER — ZOLPIDEM TARTRATE 10 MG/1
5 TABLET ORAL AT BEDTIME
Refills: 0 | Status: DISCONTINUED | OUTPATIENT
Start: 2020-10-13 | End: 2020-10-14

## 2020-10-13 RX ORDER — INSULIN LISPRO 100/ML
VIAL (ML) SUBCUTANEOUS
Refills: 0 | Status: DISCONTINUED | OUTPATIENT
Start: 2020-10-13 | End: 2020-10-14

## 2020-10-13 RX ORDER — OXYCODONE HYDROCHLORIDE 5 MG/1
5 TABLET ORAL EVERY 4 HOURS
Refills: 0 | Status: DISCONTINUED | OUTPATIENT
Start: 2020-10-13 | End: 2020-10-13

## 2020-10-13 RX ORDER — SODIUM CHLORIDE 9 MG/ML
1000 INJECTION, SOLUTION INTRAVENOUS
Refills: 0 | Status: DISCONTINUED | OUTPATIENT
Start: 2020-10-13 | End: 2020-10-14

## 2020-10-13 RX ORDER — ACETAMINOPHEN 500 MG
975 TABLET ORAL EVERY 8 HOURS
Refills: 0 | Status: DISCONTINUED | OUTPATIENT
Start: 2020-10-13 | End: 2020-10-14

## 2020-10-13 RX ORDER — MAGNESIUM HYDROXIDE 400 MG/1
30 TABLET, CHEWABLE ORAL DAILY
Refills: 0 | Status: DISCONTINUED | OUTPATIENT
Start: 2020-10-13 | End: 2020-10-14

## 2020-10-13 RX ORDER — LISINOPRIL 2.5 MG/1
1 TABLET ORAL
Qty: 0 | Refills: 0 | DISCHARGE

## 2020-10-13 RX ORDER — OXYCODONE HYDROCHLORIDE 5 MG/1
10 TABLET ORAL EVERY 12 HOURS
Refills: 0 | Status: DISCONTINUED | OUTPATIENT
Start: 2020-10-13 | End: 2020-10-14

## 2020-10-13 RX ORDER — BUPIVACAINE 13.3 MG/ML
20 INJECTION, SUSPENSION, LIPOSOMAL INFILTRATION ONCE
Refills: 0 | Status: DISCONTINUED | OUTPATIENT
Start: 2020-10-13 | End: 2020-10-14

## 2020-10-13 RX ORDER — OXYCODONE HYDROCHLORIDE 5 MG/1
10 TABLET ORAL EVERY 4 HOURS
Refills: 0 | Status: DISCONTINUED | OUTPATIENT
Start: 2020-10-13 | End: 2020-10-13

## 2020-10-13 RX ORDER — INSULIN GLARGINE 100 [IU]/ML
10 INJECTION, SOLUTION SUBCUTANEOUS AT BEDTIME
Refills: 0 | Status: DISCONTINUED | OUTPATIENT
Start: 2020-10-13 | End: 2020-10-14

## 2020-10-13 RX ORDER — KETOROLAC TROMETHAMINE 30 MG/ML
15 SYRINGE (ML) INJECTION ONCE
Refills: 0 | Status: DISCONTINUED | OUTPATIENT
Start: 2020-10-13 | End: 2020-10-13

## 2020-10-13 RX ORDER — HYDROMORPHONE HYDROCHLORIDE 2 MG/ML
0.5 INJECTION INTRAMUSCULAR; INTRAVENOUS; SUBCUTANEOUS
Refills: 0 | Status: DISCONTINUED | OUTPATIENT
Start: 2020-10-13 | End: 2020-10-13

## 2020-10-13 RX ORDER — CEFAZOLIN SODIUM 1 G
2000 VIAL (EA) INJECTION EVERY 8 HOURS
Refills: 0 | Status: COMPLETED | OUTPATIENT
Start: 2020-10-13 | End: 2020-10-13

## 2020-10-13 RX ORDER — ACETAMINOPHEN 500 MG
650 TABLET ORAL EVERY 6 HOURS
Refills: 0 | Status: DISCONTINUED | OUTPATIENT
Start: 2020-10-13 | End: 2020-10-13

## 2020-10-13 RX ORDER — OXYCODONE HYDROCHLORIDE 5 MG/1
15 TABLET ORAL EVERY 4 HOURS
Refills: 0 | Status: DISCONTINUED | OUTPATIENT
Start: 2020-10-13 | End: 2020-10-14

## 2020-10-13 RX ORDER — SENNA PLUS 8.6 MG/1
2 TABLET ORAL AT BEDTIME
Refills: 0 | Status: DISCONTINUED | OUTPATIENT
Start: 2020-10-13 | End: 2020-10-14

## 2020-10-13 RX ORDER — POLYETHYLENE GLYCOL 3350 17 G/17G
17 POWDER, FOR SOLUTION ORAL DAILY
Refills: 0 | Status: DISCONTINUED | OUTPATIENT
Start: 2020-10-13 | End: 2020-10-14

## 2020-10-13 RX ORDER — GLUCAGON INJECTION, SOLUTION 0.5 MG/.1ML
1 INJECTION, SOLUTION SUBCUTANEOUS ONCE
Refills: 0 | Status: DISCONTINUED | OUTPATIENT
Start: 2020-10-13 | End: 2020-10-14

## 2020-10-13 RX ORDER — OXYCODONE HYDROCHLORIDE 5 MG/1
10 TABLET ORAL EVERY 4 HOURS
Refills: 0 | Status: DISCONTINUED | OUTPATIENT
Start: 2020-10-13 | End: 2020-10-14

## 2020-10-13 RX ORDER — OXYCODONE HYDROCHLORIDE 5 MG/1
20 TABLET ORAL ONCE
Refills: 0 | Status: DISCONTINUED | OUTPATIENT
Start: 2020-10-13 | End: 2020-10-13

## 2020-10-13 RX ORDER — DEXTROSE 50 % IN WATER 50 %
15 SYRINGE (ML) INTRAVENOUS ONCE
Refills: 0 | Status: DISCONTINUED | OUTPATIENT
Start: 2020-10-13 | End: 2020-10-14

## 2020-10-13 RX ORDER — ASPIRIN/CALCIUM CARB/MAGNESIUM 324 MG
81 TABLET ORAL
Refills: 0 | Status: DISCONTINUED | OUTPATIENT
Start: 2020-10-14 | End: 2020-10-14

## 2020-10-13 RX ORDER — HYDROMORPHONE HYDROCHLORIDE 2 MG/ML
0.5 INJECTION INTRAMUSCULAR; INTRAVENOUS; SUBCUTANEOUS EVERY 4 HOURS
Refills: 0 | Status: DISCONTINUED | OUTPATIENT
Start: 2020-10-13 | End: 2020-10-13

## 2020-10-13 RX ADMIN — CHLORHEXIDINE GLUCONATE 1 APPLICATION(S): 213 SOLUTION TOPICAL at 06:54

## 2020-10-13 RX ADMIN — HYDROMORPHONE HYDROCHLORIDE 0.5 MILLIGRAM(S): 2 INJECTION INTRAMUSCULAR; INTRAVENOUS; SUBCUTANEOUS at 13:31

## 2020-10-13 RX ADMIN — OXYCODONE HYDROCHLORIDE 10 MILLIGRAM(S): 5 TABLET ORAL at 15:30

## 2020-10-13 RX ADMIN — OXYCODONE HYDROCHLORIDE 10 MILLIGRAM(S): 5 TABLET ORAL at 22:46

## 2020-10-13 RX ADMIN — Medication 15 MILLIGRAM(S): at 12:53

## 2020-10-13 RX ADMIN — Medication 2: at 18:08

## 2020-10-13 RX ADMIN — HYDROMORPHONE HYDROCHLORIDE 0.5 MILLIGRAM(S): 2 INJECTION INTRAMUSCULAR; INTRAVENOUS; SUBCUTANEOUS at 13:10

## 2020-10-13 RX ADMIN — ZOLPIDEM TARTRATE 5 MILLIGRAM(S): 10 TABLET ORAL at 20:52

## 2020-10-13 RX ADMIN — OXYCODONE HYDROCHLORIDE 10 MILLIGRAM(S): 5 TABLET ORAL at 16:00

## 2020-10-13 RX ADMIN — Medication 15 MILLIGRAM(S): at 18:04

## 2020-10-13 RX ADMIN — HYDROMORPHONE HYDROCHLORIDE 1 MILLIGRAM(S): 2 INJECTION INTRAMUSCULAR; INTRAVENOUS; SUBCUTANEOUS at 16:58

## 2020-10-13 RX ADMIN — Medication 975 MILLIGRAM(S): at 16:58

## 2020-10-13 RX ADMIN — HYDROMORPHONE HYDROCHLORIDE 0.5 MILLIGRAM(S): 2 INJECTION INTRAMUSCULAR; INTRAVENOUS; SUBCUTANEOUS at 12:19

## 2020-10-13 RX ADMIN — OXYCODONE HYDROCHLORIDE 10 MILLIGRAM(S): 5 TABLET ORAL at 18:04

## 2020-10-13 RX ADMIN — CELECOXIB 400 MILLIGRAM(S): 200 CAPSULE ORAL at 07:58

## 2020-10-13 RX ADMIN — OXYCODONE HYDROCHLORIDE 20 MILLIGRAM(S): 5 TABLET ORAL at 07:58

## 2020-10-13 RX ADMIN — Medication 975 MILLIGRAM(S): at 23:30

## 2020-10-13 RX ADMIN — HYDROMORPHONE HYDROCHLORIDE 0.5 MILLIGRAM(S): 2 INJECTION INTRAMUSCULAR; INTRAVENOUS; SUBCUTANEOUS at 12:55

## 2020-10-13 RX ADMIN — HYDROMORPHONE HYDROCHLORIDE 1 MILLIGRAM(S): 2 INJECTION INTRAMUSCULAR; INTRAVENOUS; SUBCUTANEOUS at 17:13

## 2020-10-13 RX ADMIN — Medication 15 MILLIGRAM(S): at 12:38

## 2020-10-13 RX ADMIN — HYDROMORPHONE HYDROCHLORIDE 0.5 MILLIGRAM(S): 2 INJECTION INTRAMUSCULAR; INTRAVENOUS; SUBCUTANEOUS at 13:16

## 2020-10-13 RX ADMIN — OXYCODONE HYDROCHLORIDE 10 MILLIGRAM(S): 5 TABLET ORAL at 19:04

## 2020-10-13 RX ADMIN — INSULIN GLARGINE 10 UNIT(S): 100 INJECTION, SOLUTION SUBCUTANEOUS at 22:15

## 2020-10-13 RX ADMIN — ATORVASTATIN CALCIUM 40 MILLIGRAM(S): 80 TABLET, FILM COATED ORAL at 20:52

## 2020-10-13 RX ADMIN — Medication 100 MILLIGRAM(S): at 16:58

## 2020-10-13 RX ADMIN — Medication 975 MILLIGRAM(S): at 17:58

## 2020-10-13 RX ADMIN — HYDROMORPHONE HYDROCHLORIDE 0.5 MILLIGRAM(S): 2 INJECTION INTRAMUSCULAR; INTRAVENOUS; SUBCUTANEOUS at 13:11

## 2020-10-13 RX ADMIN — OXYCODONE HYDROCHLORIDE 10 MILLIGRAM(S): 5 TABLET ORAL at 21:45

## 2020-10-13 RX ADMIN — CELECOXIB 400 MILLIGRAM(S): 200 CAPSULE ORAL at 08:05

## 2020-10-13 RX ADMIN — Medication 15 MILLIGRAM(S): at 23:30

## 2020-10-13 RX ADMIN — HYDROMORPHONE HYDROCHLORIDE 0.5 MILLIGRAM(S): 2 INJECTION INTRAMUSCULAR; INTRAVENOUS; SUBCUTANEOUS at 14:16

## 2020-10-13 RX ADMIN — HYDROMORPHONE HYDROCHLORIDE 0.5 MILLIGRAM(S): 2 INJECTION INTRAMUSCULAR; INTRAVENOUS; SUBCUTANEOUS at 14:31

## 2020-10-13 RX ADMIN — Medication 15 MILLIGRAM(S): at 18:19

## 2020-10-13 RX ADMIN — Medication 15 MILLIGRAM(S): at 23:07

## 2020-10-13 RX ADMIN — OXYCODONE HYDROCHLORIDE 20 MILLIGRAM(S): 5 TABLET ORAL at 08:05

## 2020-10-13 RX ADMIN — Medication 1: at 21:06

## 2020-10-13 RX ADMIN — Medication 975 MILLIGRAM(S): at 23:07

## 2020-10-13 NOTE — PHYSICAL THERAPY INITIAL EVALUATION ADULT - ADDITIONAL COMMENTS
Pt. has been ambulating with SC since his R hip has gotten worse. Pt. lives in a 2 floor walk up with a total of 20 steps to enter his apartment. Pt. lives with wife.

## 2020-10-13 NOTE — BRIEF OPERATIVE NOTE - NSICDXBRIEFPREOP_GEN_ALL_CORE_FT
PRE-OP DIAGNOSIS:  Primary osteoarthritis of right hip 13-Oct-2020 12:02:42 Primary osteoarthritis of right hip Marcial Hanson

## 2020-10-13 NOTE — CONSULT NOTE ADULT - ASSESSMENT
ASSESSMENT: This is a 55y old Male with a history of hypertension, osteoarthritis, hyperlipidemia, Type 2 diabetes, prostate CA, s/p R THR.       Recommended Treatment PLAN:  1. Recommend substance abuse consult  2. Recommend be discharged with close followup of a substance abuse center  3. Oxycodone 10-15mg Po Q4h prn moderate to severe pain  4. Dilaudid 1mg Q1h IVP prn breakthrough pain   5. Flexeril 5mg Po Q8h prn muscle spasms  6.  tylenol 975mg PO Q8  7. oxycontin 10mg Po BID  Plan formed by Dr. Delgado at bedside

## 2020-10-13 NOTE — CONSULT NOTE ADULT - SUBJECTIVE AND OBJECTIVE BOX
Pain Management Consult Note - [facility] [phone]      Chief Complaint: R Hip Pain      HPI: Patient seen and examined today. Patient with a history of hypertension, osteoarthritis, hyperlipidemia, Type 2 diabetes, prostate CA, s/p R THR. Patient reports R hip pain and surgical site pain. Patient reports purchasing "Oxycontin 20s" from a friend and taking an average of 40s BID or until he feels better. Patient also reports he has used heroin in the past, last use 4 months ago. Patient reports he also has several bottles of methadone PO at home and interchanged taking methadone PO on days he was not taking Oxycontin PO. Reviewed pain medication regimen with patient. Dressing intact          Pain is _x__ sharp ____dull ___burning x___achy ___ Intensity: ____ mild _x__mod ___severe     Location _x___surgical site ____cervical _____lumbar ____abd ____upper ext_x___R lower ext    Worse with ____activity _x___movement _____physical therapy___ Rest    Improved with _x___medication _x___rest ____physical therapy      ROS: Const:  _-__febrile   Eyes:___ENT:___CV: _-__chest pain  Resp: __-__sob  GI:_-__nausea _-__vomiting __-_abd pain ___npo ___clears _x_full diet __bm  :___ Musk: _x__pain _x__spasm  Skin:___ Neuro:  _-__imnaesjq__-_ykjpawmnl__-_ numbness _x__weakness _-__paresth  Psych:_-_anxiety  Endo:___ Heme:___Allergy:_________, _x__all others reviewed and negative      PAST MEDICAL & SURGICAL HISTORY:  Prostate cancer  HTN (hypertension)  Osteoarthritis  right hip  work related injury  Hyperlipidemia, unspecified hyperlipidemia type  Type 2 diabetes mellitus without complication, unspecified long term insulin use status  H/O total knee replacement, left  Dec. 2017  Surgery, elective  left knee meniscus  Surgery, elective  prostatectomy,  2013        SH: _-__Tobacco   _-__Alcohol                          FH:FAMILY HISTORY:  Family history of chronic ischemic heart disease (Mother)  Family history of diabetes mellitus (Father)        acetaminophen   Tablet .. 975 milliGRAM(s) Oral every 8 hours  aluminum hydroxide/magnesium hydroxide/simethicone Suspension 30 milliLiter(s) Oral four times a day PRN  atorvastatin 40 milliGRAM(s) Oral at bedtime  BUpivacaine liposome 1.3% Injectable (no eMAR) 20 milliLiter(s) Local Injection once  ceFAZolin   IVPB 2000 milliGRAM(s) IV Intermittent every 8 hours  cyclobenzaprine 5 milliGRAM(s) Oral three times a day PRN  dextrose 40% Gel 15 Gram(s) Oral once PRN  dextrose 5%. 1000 milliLiter(s) IV Continuous <Continuous>  dextrose 50% Injectable 12.5 Gram(s) IV Push once  dextrose 50% Injectable 25 Gram(s) IV Push once  dextrose 50% Injectable 25 Gram(s) IV Push once  glucagon  Injectable 1 milliGRAM(s) IntraMuscular once PRN  HYDROmorphone  Injectable 1 milliGRAM(s) IV Push every 2 hours PRN  influenza   Vaccine 0.5 milliLiter(s) IntraMuscular once  insulin glargine Injectable (LANTUS) 10 Unit(s) SubCutaneous at bedtime  insulin lispro (HumaLOG) corrective regimen sliding scale   SubCutaneous Before meals and at bedtime  ketorolac   Injectable 15 milliGRAM(s) IV Push every 6 hours  lactated ringers. 1000 milliLiter(s) IV Continuous <Continuous>  magnesium hydroxide Suspension 30 milliLiter(s) Oral daily PRN  ondansetron Injectable 4 milliGRAM(s) IV Push every 6 hours PRN  oxyCODONE    IR 10 milliGRAM(s) Oral every 4 hours PRN  oxyCODONE    IR 15 milliGRAM(s) Oral every 4 hours PRN  polyethylene glycol 3350 17 Gram(s) Oral daily  senna 2 Tablet(s) Oral at bedtime PRN      T(C): 37.4 (10-13-20 @ 16:00), Max: 37.4 (10-13-20 @ 16:00)  HR: 81 (10-13-20 @ 16:00) (64 - 86)  BP: 151/82 (10-13-20 @ 16:00) (151/82 - 178/74)  RR: 18 (10-13-20 @ 16:00) (16 - 25)  SpO2: 100% (10-13-20 @ 16:00) (98% - 100%)  Wt(kg): --    T(C): 37.4 (10-13-20 @ 16:00), Max: 37.4 (10-13-20 @ 16:00)  HR: 81 (10-13-20 @ 16:00) (64 - 86)  BP: 151/82 (10-13-20 @ 16:00) (151/82 - 178/74)  RR: 18 (10-13-20 @ 16:00) (16 - 25)  SpO2: 100% (10-13-20 @ 16:00) (98% - 100%)  Wt(kg): --    T(C): 37.4 (10-13-20 @ 16:00), Max: 37.4 (10-13-20 @ 16:00)  HR: 81 (10-13-20 @ 16:00) (64 - 86)  BP: 151/82 (10-13-20 @ 16:00) (151/82 - 178/74)  RR: 18 (10-13-20 @ 16:00) (16 - 25)  SpO2: 100% (10-13-20 @ 16:00) (98% - 100%)  Wt(kg): --      PHYSICAL EXAM:  Gen Appearance: x___no acute distress _x__appropriate        Neuro: _x__SILT feet____ EOM Intact Psych: AAOX_3_, _x__mood/affect appropriate        Eyes: _x__conjunctiva WNL  __x___ Pupils equal and round        ENT: _x__ears and nose atraumatic_x__ Hearing grossly intact        Neck: x___trachea midline, no visible masses ___thyroid without palpable mass    Resp: x___Nml WOB____No tactile fremitus ___clear to auscultation    Cardio: _x__extremities free from edema _x___pedal pulses palpable    GI/Abdomen: _x__soft __x___ Nontender____x__Nondistended_____HSM    Lymphatic: ___no palpable nodes in neck  ___no palpable nodes calves and feet    Skin/Wound: ___Incision, x___Dressing c/d/i,   ____surrounding tissues soft,  ___drain/chest tube present____    Muscular: EHL _5__/5  Gastrocnemius__5_/5    ___absent clubbing/cyanosis        ASSESSMENT: This is a 55y old Male with a history of hypertension, osteoarthritis, hyperlipidemia, Type 2 diabetes, prostate CA, s/p R THR.       Recommended Treatment PLAN:  1. Recommend substance abuse consult  2. Recommend be discharged with close followup of a substance abuse center  3. Oxycodone 10-15mg Po Q4h prn moderate to severe pain  4. Dilaudid 1mg Q1h IVP prn breakthrough pain   5. Flexeril 5mg Po Q8h prn muscle spasms  6.  tylenol 975mg PO Q8  Plan discussed with Dr. Delgado             Pain Management Consult Note - [facility] [phone]      Chief Complaint: R Hip Pain      HPI: Patient seen and examined today. Patient with a history of hypertension, osteoarthritis, hyperlipidemia, Type 2 diabetes, prostate CA, s/p R THR. Patient reports R hip pain and surgical site pain. Patient reports purchasing "Oxycontin 20s" from a friend and taking an average of 40s BID or until he feels better. Patient also reports he has used heroin in the past, last use 4 months ago. Patient reports he also has several bottles of methadone PO at home and interchanged taking methadone PO on days he was not taking Oxycontin PO. Reviewed pain medication regimen with patient. Dressing intact          Pain is _x__ sharp ____dull ___burning x___achy ___ Intensity: ____ mild _x__mod ___severe     Location _x___surgical site ____cervical _____lumbar ____abd ____upper ext_x___R lower ext    Worse with ____activity _x___movement _____physical therapy___ Rest    Improved with _x___medication _x___rest ____physical therapy      ROS: Const:  _-__febrile   Eyes:___ENT:___CV: _-__chest pain  Resp: __-__sob  GI:_-__nausea _-__vomiting __-_abd pain ___npo ___clears _x_full diet __bm  :___ Musk: _x__pain _x__spasm  Skin:___ Neuro:  _-__uavaxrmp__-_rikrsmqmj__-_ numbness _x__weakness _-__paresth  Psych:_-_anxiety  Endo:___ Heme:___Allergy:_________, _x__all others reviewed and negative      PAST MEDICAL & SURGICAL HISTORY:  Prostate cancer  HTN (hypertension)  Osteoarthritis  right hip  work related injury  Hyperlipidemia, unspecified hyperlipidemia type  Type 2 diabetes mellitus without complication, unspecified long term insulin use status  H/O total knee replacement, left  Dec. 2017  Surgery, elective  left knee meniscus  Surgery, elective  prostatectomy,  2013        SH: _-__Tobacco   _-__Alcohol                          FH:FAMILY HISTORY:  Family history of chronic ischemic heart disease (Mother)  Family history of diabetes mellitus (Father)        acetaminophen   Tablet .. 975 milliGRAM(s) Oral every 8 hours  aluminum hydroxide/magnesium hydroxide/simethicone Suspension 30 milliLiter(s) Oral four times a day PRN  atorvastatin 40 milliGRAM(s) Oral at bedtime  BUpivacaine liposome 1.3% Injectable (no eMAR) 20 milliLiter(s) Local Injection once  ceFAZolin   IVPB 2000 milliGRAM(s) IV Intermittent every 8 hours  cyclobenzaprine 5 milliGRAM(s) Oral three times a day PRN  dextrose 40% Gel 15 Gram(s) Oral once PRN  dextrose 5%. 1000 milliLiter(s) IV Continuous <Continuous>  dextrose 50% Injectable 12.5 Gram(s) IV Push once  dextrose 50% Injectable 25 Gram(s) IV Push once  dextrose 50% Injectable 25 Gram(s) IV Push once  glucagon  Injectable 1 milliGRAM(s) IntraMuscular once PRN  HYDROmorphone  Injectable 1 milliGRAM(s) IV Push every 2 hours PRN  influenza   Vaccine 0.5 milliLiter(s) IntraMuscular once  insulin glargine Injectable (LANTUS) 10 Unit(s) SubCutaneous at bedtime  insulin lispro (HumaLOG) corrective regimen sliding scale   SubCutaneous Before meals and at bedtime  ketorolac   Injectable 15 milliGRAM(s) IV Push every 6 hours  lactated ringers. 1000 milliLiter(s) IV Continuous <Continuous>  magnesium hydroxide Suspension 30 milliLiter(s) Oral daily PRN  ondansetron Injectable 4 milliGRAM(s) IV Push every 6 hours PRN  oxyCODONE    IR 10 milliGRAM(s) Oral every 4 hours PRN  oxyCODONE    IR 15 milliGRAM(s) Oral every 4 hours PRN  polyethylene glycol 3350 17 Gram(s) Oral daily  senna 2 Tablet(s) Oral at bedtime PRN      T(C): 37.4 (10-13-20 @ 16:00), Max: 37.4 (10-13-20 @ 16:00)  HR: 81 (10-13-20 @ 16:00) (64 - 86)  BP: 151/82 (10-13-20 @ 16:00) (151/82 - 178/74)  RR: 18 (10-13-20 @ 16:00) (16 - 25)  SpO2: 100% (10-13-20 @ 16:00) (98% - 100%)  Wt(kg): --    T(C): 37.4 (10-13-20 @ 16:00), Max: 37.4 (10-13-20 @ 16:00)  HR: 81 (10-13-20 @ 16:00) (64 - 86)  BP: 151/82 (10-13-20 @ 16:00) (151/82 - 178/74)  RR: 18 (10-13-20 @ 16:00) (16 - 25)  SpO2: 100% (10-13-20 @ 16:00) (98% - 100%)  Wt(kg): --    T(C): 37.4 (10-13-20 @ 16:00), Max: 37.4 (10-13-20 @ 16:00)  HR: 81 (10-13-20 @ 16:00) (64 - 86)  BP: 151/82 (10-13-20 @ 16:00) (151/82 - 178/74)  RR: 18 (10-13-20 @ 16:00) (16 - 25)  SpO2: 100% (10-13-20 @ 16:00) (98% - 100%)  Wt(kg): --      PHYSICAL EXAM:  Gen Appearance: x___no acute distress _x__appropriate        Neuro: _x__SILT feet____ EOM Intact Psych: AAOX_3_, _x__mood/affect appropriate        Eyes: _x__conjunctiva WNL  __x___ Pupils equal and round        ENT: _x__ears and nose atraumatic_x__ Hearing grossly intact        Neck: x___trachea midline, no visible masses ___thyroid without palpable mass    Resp: x___Nml WOB____No tactile fremitus ___clear to auscultation    Cardio: _x__extremities free from edema _x___pedal pulses palpable    GI/Abdomen: _x__soft __x___ Nontender____x__Nondistended_____HSM    Lymphatic: ___no palpable nodes in neck  ___no palpable nodes calves and feet    Skin/Wound: ___Incision, x___Dressing c/d/i,   ____surrounding tissues soft,  ___drain/chest tube present____    Muscular: EHL _5__/5  Gastrocnemius__5_/5    ___absent clubbing/cyanosis        ASSESSMENT: This is a 55y old Male with a history of hypertension, osteoarthritis, hyperlipidemia, Type 2 diabetes, prostate CA, s/p R THR.       Recommended Treatment PLAN:  1. Recommend substance abuse consult  2. Recommend be discharged with close followup of a substance abuse center  3. Oxycodone 10-15mg Po Q4h prn moderate to severe pain  4. Dilaudid 1mg Q1h IVP prn breakthrough pain   5. Flexeril 5mg Po Q8h prn muscle spasms  6.  tylenol 975mg PO Q8  7. oxycontin 10mg Po BID  Plan discussed with Dr. Delgado at bedside             Pain Management Consult Note - [facility] [phone]      Chief Complaint: R Hip Pain      HPI: Patient seen and examined today. Patient with a history of hypertension, osteoarthritis, hyperlipidemia, Type 2 diabetes, prostate CA, s/p R THR. Patient reports R hip pain and surgical site pain. Patient reports purchasing "Oxycontin 20s" from a friend and taking an average of 40s BID or until he feels better. Patient also reports he has used heroin in the past, last use 4 months ago. Patient reports he also has several bottles of methadone PO at home and interchanged taking methadone PO on days he was not taking Oxycontin PO. Reviewed pain medication regimen with patient. Dressing intact          Pain is _x__ sharp ____dull ___burning x___achy ___ Intensity: ____ mild _x__mod ___severe     Location _x___surgical site ____cervical _____lumbar ____abd ____upper ext_x___R lower ext    Worse with ____activity _x___movement _____physical therapy___ Rest    Improved with _x___medication _x___rest ____physical therapy      ROS: Const:  _-__febrile   Eyes:___ENT:___CV: _-__chest pain  Resp: __-__sob  GI:_-__nausea _-__vomiting __-_abd pain ___npo ___clears _x_full diet __bm  :___ Musk: _x__pain _x__spasm  Skin:___ Neuro:  _-__bdyrxvdc__-_sspozufue__-_ numbness _x__weakness _-__paresth  Psych:_-_anxiety  Endo:___ Heme:___Allergy:_________, _x__all others reviewed and negative      PAST MEDICAL & SURGICAL HISTORY:  Prostate cancer  HTN (hypertension)  Osteoarthritis  right hip  work related injury  Hyperlipidemia, unspecified hyperlipidemia type  Type 2 diabetes mellitus without complication, unspecified long term insulin use status  H/O total knee replacement, left  Dec. 2017  Surgery, elective  left knee meniscus  Surgery, elective  prostatectomy,  2013        SH: _-__Tobacco   _-__Alcohol                          FH:FAMILY HISTORY:  Family history of chronic ischemic heart disease (Mother)  Family history of diabetes mellitus (Father)        acetaminophen   Tablet .. 975 milliGRAM(s) Oral every 8 hours  aluminum hydroxide/magnesium hydroxide/simethicone Suspension 30 milliLiter(s) Oral four times a day PRN  atorvastatin 40 milliGRAM(s) Oral at bedtime  BUpivacaine liposome 1.3% Injectable (no eMAR) 20 milliLiter(s) Local Injection once  ceFAZolin   IVPB 2000 milliGRAM(s) IV Intermittent every 8 hours  cyclobenzaprine 5 milliGRAM(s) Oral three times a day PRN  dextrose 40% Gel 15 Gram(s) Oral once PRN  dextrose 5%. 1000 milliLiter(s) IV Continuous <Continuous>  dextrose 50% Injectable 12.5 Gram(s) IV Push once  dextrose 50% Injectable 25 Gram(s) IV Push once  dextrose 50% Injectable 25 Gram(s) IV Push once  glucagon  Injectable 1 milliGRAM(s) IntraMuscular once PRN  HYDROmorphone  Injectable 1 milliGRAM(s) IV Push every 2 hours PRN  influenza   Vaccine 0.5 milliLiter(s) IntraMuscular once  insulin glargine Injectable (LANTUS) 10 Unit(s) SubCutaneous at bedtime  insulin lispro (HumaLOG) corrective regimen sliding scale   SubCutaneous Before meals and at bedtime  ketorolac   Injectable 15 milliGRAM(s) IV Push every 6 hours  lactated ringers. 1000 milliLiter(s) IV Continuous <Continuous>  magnesium hydroxide Suspension 30 milliLiter(s) Oral daily PRN  ondansetron Injectable 4 milliGRAM(s) IV Push every 6 hours PRN  oxyCODONE    IR 10 milliGRAM(s) Oral every 4 hours PRN  oxyCODONE    IR 15 milliGRAM(s) Oral every 4 hours PRN  polyethylene glycol 3350 17 Gram(s) Oral daily  senna 2 Tablet(s) Oral at bedtime PRN      T(C): 37.4 (10-13-20 @ 16:00), Max: 37.4 (10-13-20 @ 16:00)  HR: 81 (10-13-20 @ 16:00) (64 - 86)  BP: 151/82 (10-13-20 @ 16:00) (151/82 - 178/74)  RR: 18 (10-13-20 @ 16:00) (16 - 25)  SpO2: 100% (10-13-20 @ 16:00) (98% - 100%)  Wt(kg): --    T(C): 37.4 (10-13-20 @ 16:00), Max: 37.4 (10-13-20 @ 16:00)  HR: 81 (10-13-20 @ 16:00) (64 - 86)  BP: 151/82 (10-13-20 @ 16:00) (151/82 - 178/74)  RR: 18 (10-13-20 @ 16:00) (16 - 25)  SpO2: 100% (10-13-20 @ 16:00) (98% - 100%)  Wt(kg): --    T(C): 37.4 (10-13-20 @ 16:00), Max: 37.4 (10-13-20 @ 16:00)  HR: 81 (10-13-20 @ 16:00) (64 - 86)  BP: 151/82 (10-13-20 @ 16:00) (151/82 - 178/74)  RR: 18 (10-13-20 @ 16:00) (16 - 25)  SpO2: 100% (10-13-20 @ 16:00) (98% - 100%)  Wt(kg): --      PHYSICAL EXAM:  Gen Appearance: x___no acute distress _x__appropriate        Neuro: _x__SILT feet____ EOM Intact Psych: AAOX_3_, _x__mood/affect appropriate        Eyes: _x__conjunctiva WNL  __x___ Pupils equal and round        ENT: _x__ears and nose atraumatic_x__ Hearing grossly intact        Neck: x___trachea midline, no visible masses ___thyroid without palpable mass    Resp: x___Nml WOB____No tactile fremitus ___clear to auscultation    Cardio: _x__extremities free from edema _x___pedal pulses palpable    GI/Abdomen: _x__soft __x___ Nontender____x__Nondistended_____HSM    Lymphatic: ___no palpable nodes in neck  ___no palpable nodes calves and feet    Skin/Wound: ___Incision, x___Dressing c/d/i,   ____surrounding tissues soft,  ___drain/chest tube present____    Muscular: EHL _5__/5  Gastrocnemius__5_/5    ___absent clubbing/cyanosis

## 2020-10-13 NOTE — BRIEF OPERATIVE NOTE - NSICDXBRIEFPOSTOP_GEN_ALL_CORE_FT
POST-OP DIAGNOSIS:  Primary osteoarthritis of right hip 13-Oct-2020 12:02:49 Primary osteoarthritis of right hip Marcial Hanson

## 2020-10-13 NOTE — PROGRESS NOTE ADULT - SUBJECTIVE AND OBJECTIVE BOX
Ortho Note    Pt c/o 7/10 pain, received IV dilaudid & toradol, states takes 20mg oxycodone multiple times a day at home for the right hip.  Denies CP, SOB, N/V, numbness/tingling     Vital Signs Last 24 Hrs  T(C): 36.3 (10-13-20 @ 12:05), Max: 36.3 (10-13-20 @ 12:05)  T(F): 97.3 (10-13-20 @ 12:05), Max: 97.3 (10-13-20 @ 12:05)  HR: 68 (10-13-20 @ 13:05) (64 - 78)  BP: 165/77 (10-13-20 @ 13:05) (163/71 - 178/74)  BP(mean): 111 (10-13-20 @ 13:05) (102 - 120)  RR: 20 (10-13-20 @ 13:05) (16 - 25)  SpO2: 100% (10-13-20 @ 13:05) (98% - 100%)    General: Pt Alert and oriented, NAD  DSG C/D/I right hip with aquacel  Pulses intact RLE  Sensation intact RLE  Motor: EHL/FHL/TA/GS 5/5 RLE    Xray: prosthesis in place    A/P: 55yMale POD#0 s/p Right anterior THR  - Stable  - Pain management consulted  - DVT ppx scds  - PT, WBS: wbat    Ortho Pager [phone]

## 2020-10-13 NOTE — PHYSICAL THERAPY INITIAL EVALUATION ADULT - PERTINENT HX OF CURRENT PROBLEM, REHAB EVAL
55 year old male presents with right hip pain x over 1 year. Patient denies known injury. Reports right hip pain developed gradually. Patient states he underwent L TKA 1 year ago with Dr. Archuleta and during recovery is when he began to feel worsening pain right hip. Patient using cane for assistance. Denies numbness/tingling. Patient reports failure of conservative management including activity modification, oral analgesics.

## 2020-10-13 NOTE — PHYSICAL THERAPY INITIAL EVALUATION ADULT - CRITERIA FOR SKILLED THERAPEUTIC INTERVENTIONS
predicted duration of therapy intervention/anticipated discharge recommendation/therapy frequency/impairments found/rehab potential/functional limitations in following categories/anticipated equipment needs at discharge

## 2020-10-14 ENCOUNTER — TRANSCRIPTION ENCOUNTER (OUTPATIENT)
Age: 55
End: 2020-10-14

## 2020-10-14 VITALS
OXYGEN SATURATION: 100 % | TEMPERATURE: 98 F | HEART RATE: 82 BPM | SYSTOLIC BLOOD PRESSURE: 124 MMHG | DIASTOLIC BLOOD PRESSURE: 78 MMHG | RESPIRATION RATE: 16 BRPM

## 2020-10-14 DIAGNOSIS — F13.10 SEDATIVE, HYPNOTIC OR ANXIOLYTIC ABUSE, UNCOMPLICATED: ICD-10-CM

## 2020-10-14 DIAGNOSIS — F11.20 OPIOID DEPENDENCE, UNCOMPLICATED: ICD-10-CM

## 2020-10-14 DIAGNOSIS — F12.10 CANNABIS ABUSE, UNCOMPLICATED: ICD-10-CM

## 2020-10-14 DIAGNOSIS — F14.10 COCAINE ABUSE, UNCOMPLICATED: ICD-10-CM

## 2020-10-14 LAB
A1C WITH ESTIMATED AVERAGE GLUCOSE RESULT: 8.9 % — HIGH (ref 4–5.6)
ANION GAP SERPL CALC-SCNC: 12 MMOL/L — SIGNIFICANT CHANGE UP (ref 5–17)
APTT BLD: 26.4 SEC — LOW (ref 27.5–35.5)
BUN SERPL-MCNC: 18 MG/DL — SIGNIFICANT CHANGE UP (ref 7–23)
CALCIUM SERPL-MCNC: 8.4 MG/DL — SIGNIFICANT CHANGE UP (ref 8.4–10.5)
CHLORIDE SERPL-SCNC: 102 MMOL/L — SIGNIFICANT CHANGE UP (ref 96–108)
CO2 SERPL-SCNC: 24 MMOL/L — SIGNIFICANT CHANGE UP (ref 22–31)
CREAT SERPL-MCNC: 1.15 MG/DL — SIGNIFICANT CHANGE UP (ref 0.5–1.3)
ESTIMATED AVERAGE GLUCOSE: 209 MG/DL — HIGH (ref 68–114)
GLUCOSE SERPL-MCNC: 243 MG/DL — HIGH (ref 70–99)
HCT VFR BLD CALC: 30.6 % — LOW (ref 39–50)
HGB BLD-MCNC: 9.8 G/DL — LOW (ref 13–17)
INR BLD: 1.11 — SIGNIFICANT CHANGE UP (ref 0.88–1.16)
MCHC RBC-ENTMCNC: 27 PG — SIGNIFICANT CHANGE UP (ref 27–34)
MCHC RBC-ENTMCNC: 32 GM/DL — SIGNIFICANT CHANGE UP (ref 32–36)
MCV RBC AUTO: 84.3 FL — SIGNIFICANT CHANGE UP (ref 80–100)
NRBC # BLD: 0 /100 WBCS — SIGNIFICANT CHANGE UP (ref 0–0)
PLATELET # BLD AUTO: 227 K/UL — SIGNIFICANT CHANGE UP (ref 150–400)
POTASSIUM SERPL-MCNC: 4 MMOL/L — SIGNIFICANT CHANGE UP (ref 3.5–5.3)
POTASSIUM SERPL-SCNC: 4 MMOL/L — SIGNIFICANT CHANGE UP (ref 3.5–5.3)
PROTHROM AB SERPL-ACNC: 13.3 SEC — SIGNIFICANT CHANGE UP (ref 10.6–13.6)
RBC # BLD: 3.63 M/UL — LOW (ref 4.2–5.8)
RBC # FLD: 12.9 % — SIGNIFICANT CHANGE UP (ref 10.3–14.5)
SODIUM SERPL-SCNC: 138 MMOL/L — SIGNIFICANT CHANGE UP (ref 135–145)
WBC # BLD: 8.08 K/UL — SIGNIFICANT CHANGE UP (ref 3.8–10.5)
WBC # FLD AUTO: 8.08 K/UL — SIGNIFICANT CHANGE UP (ref 3.8–10.5)

## 2020-10-14 PROCEDURE — 99255 IP/OBS CONSLTJ NEW/EST HI 80: CPT | Mod: GC

## 2020-10-14 PROCEDURE — 99222 1ST HOSP IP/OBS MODERATE 55: CPT

## 2020-10-14 RX ORDER — ACETAMINOPHEN 500 MG
3 TABLET ORAL
Qty: 0 | Refills: 0 | DISCHARGE
Start: 2020-10-14

## 2020-10-14 RX ORDER — NALOXONE HYDROCHLORIDE 4 MG/.1ML
4 SPRAY NASAL
Qty: 4 | Refills: 0
Start: 2020-10-14 | End: 2020-10-14

## 2020-10-14 RX ORDER — ALPRAZOLAM 0.25 MG
1 TABLET ORAL EVERY 12 HOURS
Refills: 0 | Status: DISCONTINUED | OUTPATIENT
Start: 2020-10-14 | End: 2020-10-14

## 2020-10-14 RX ORDER — ALPRAZOLAM 0.25 MG
1 TABLET ORAL
Qty: 6 | Refills: 0
Start: 2020-10-14 | End: 2020-10-16

## 2020-10-14 RX ORDER — OXYCODONE HYDROCHLORIDE 5 MG/1
1 TABLET ORAL
Qty: 6 | Refills: 0
Start: 2020-10-14 | End: 2020-10-16

## 2020-10-14 RX ORDER — OXYCODONE HYDROCHLORIDE 5 MG/1
1 TABLET ORAL
Qty: 18 | Refills: 0
Start: 2020-10-14 | End: 2020-10-16

## 2020-10-14 RX ORDER — INSULIN GLARGINE 100 [IU]/ML
20 INJECTION, SOLUTION SUBCUTANEOUS
Qty: 0 | Refills: 0 | DISCHARGE

## 2020-10-14 RX ORDER — ATORVASTATIN CALCIUM 80 MG/1
1 TABLET, FILM COATED ORAL
Qty: 0 | Refills: 0 | DISCHARGE

## 2020-10-14 RX ORDER — ASPIRIN/CALCIUM CARB/MAGNESIUM 324 MG
1 TABLET ORAL
Qty: 30 | Refills: 0
Start: 2020-10-14 | End: 2020-11-12

## 2020-10-14 RX ADMIN — Medication 15 MILLIGRAM(S): at 11:12

## 2020-10-14 RX ADMIN — Medication 15 MILLIGRAM(S): at 11:32

## 2020-10-14 RX ADMIN — Medication 975 MILLIGRAM(S): at 17:21

## 2020-10-14 RX ADMIN — Medication 975 MILLIGRAM(S): at 06:01

## 2020-10-14 RX ADMIN — Medication 81 MILLIGRAM(S): at 18:40

## 2020-10-14 RX ADMIN — Medication 15 MILLIGRAM(S): at 06:01

## 2020-10-14 RX ADMIN — Medication 15 MILLIGRAM(S): at 06:30

## 2020-10-14 RX ADMIN — OXYCODONE HYDROCHLORIDE 15 MILLIGRAM(S): 5 TABLET ORAL at 17:17

## 2020-10-14 RX ADMIN — Medication 1 MILLIGRAM(S): at 11:54

## 2020-10-14 RX ADMIN — Medication 975 MILLIGRAM(S): at 06:30

## 2020-10-14 RX ADMIN — OXYCODONE HYDROCHLORIDE 10 MILLIGRAM(S): 5 TABLET ORAL at 06:30

## 2020-10-14 RX ADMIN — POLYETHYLENE GLYCOL 3350 17 GRAM(S): 17 POWDER, FOR SOLUTION ORAL at 11:12

## 2020-10-14 RX ADMIN — OXYCODONE HYDROCHLORIDE 15 MILLIGRAM(S): 5 TABLET ORAL at 16:47

## 2020-10-14 RX ADMIN — Medication 81 MILLIGRAM(S): at 05:40

## 2020-10-14 RX ADMIN — OXYCODONE HYDROCHLORIDE 10 MILLIGRAM(S): 5 TABLET ORAL at 19:15

## 2020-10-14 RX ADMIN — Medication 975 MILLIGRAM(S): at 16:48

## 2020-10-14 RX ADMIN — LISINOPRIL 20 MILLIGRAM(S): 2.5 TABLET ORAL at 05:40

## 2020-10-14 RX ADMIN — Medication 2: at 13:05

## 2020-10-14 RX ADMIN — OXYCODONE HYDROCHLORIDE 10 MILLIGRAM(S): 5 TABLET ORAL at 18:40

## 2020-10-14 RX ADMIN — OXYCODONE HYDROCHLORIDE 10 MILLIGRAM(S): 5 TABLET ORAL at 05:40

## 2020-10-14 RX ADMIN — Medication 100 MILLIGRAM(S): at 00:07

## 2020-10-14 RX ADMIN — Medication 2: at 07:11

## 2020-10-14 RX ADMIN — OXYCODONE HYDROCHLORIDE 15 MILLIGRAM(S): 5 TABLET ORAL at 11:49

## 2020-10-14 RX ADMIN — OXYCODONE HYDROCHLORIDE 15 MILLIGRAM(S): 5 TABLET ORAL at 11:12

## 2020-10-14 NOTE — BEHAVIORAL HEALTH ASSESSMENT NOTE - NSBHADMITCOUNSEL_PSY_A_CORE
instructions for management, treatment and follow up/risk factor reduction/diagnostic results/impressions and/or recommended studies

## 2020-10-14 NOTE — PROGRESS NOTE ADULT - SUBJECTIVE AND OBJECTIVE BOX
Ortho Note    Pt comfortable without complaints, pain controlled. Reports feeling "well" today.  Denies CP, SOB, N/V, numbness/tingling. Patient admits to h/o substance abuse but reports using "pills only", which  he buys off the street. Has been taking oxycodone or oxycontin (not sure which one), and sometimes methadone, though is   not actually prescribed these medications. Also reports to taking xanax "sometimes". Currently reports mild symptoms of opioid withdrawal  including hot flashes and chills. VSS. Denies n/v, agitation. Reports feeling "much better" today than yesterday.      Vital Signs Last 24 Hrs  T(C): 36.8 (10-14-20 @ 08:40), Max: 36.8 (10-14-20 @ 08:40)  T(F): 98.3 (10-14-20 @ 08:40), Max: 98.3 (10-14-20 @ 08:40)  HR: 88 (10-14-20 @ 08:40) (88 - 88)  BP: 123/76 (10-14-20 @ 08:40) (123/76 - 123/76)  BP(mean): --  RR: 16 (10-14-20 @ 08:40) (16 - 16)  SpO2: 99% (10-14-20 @ 08:40) (99% - 99%)  AVSS    focused exam:  General: Pt Alert and oriented, NAD  DSG C/D/I  Pulses: +2DP, WWP feet  Sensation: SILT BLE  Motor: 5/5 EHL/FHL/TA/GS                          9.8    8.08  )-----------( 227      ( 14 Oct 2020 06:15 )             30.6   14 Oct 2020 06:15    138    |  102    |  18     ----------------------------<  243    4.0     |  24     |  1.15     Ca    8.4        14 Oct 2020 06:15        A/P: 55yMale POD#1 s/p right total hip replacement  - polysubstance abuse- Spoke with patient who is interested in weaning off of substances after recovers from surgery. [facility] the block from his home. SW consulted to provide outpatient and inpatient detox facility information. Psych/ substance abuse consulted inhouse to see patient, appreciate recs. Mild s+s of withdrawal currently (diaphoresis, hot flashes), medicine consulted- appreciate recs. Will start xanax 1mg q12h and reassess.   - Pain Control- patient takes opioids/methadone outpatient but unknown if ER or IR, and unsure of doses, inconsistent frequencies; appreciate pain management recs  - DVT ppx: ASA bid  - PT, WBS: WBAT  - appreciate psych and internal medicine recs prior to d/c  - OOB for meals, aggressive I/S  - bowel regimen  - dispo: home with [facility]    Ortho Pager [phone]

## 2020-10-14 NOTE — CONSULT NOTE ADULT - SUBJECTIVE AND OBJECTIVE BOX
Patient is a 55y old  Male who presents with a chief complaint of Right hip pain (14 Oct 2020 12:07)      HPI:  55 year old male presents with right hip pain x over 1 year. Patient denies known injury. Reports right hip pain developed gradually. Patient states he underwent L TKA 1 year ago with Dr. Archuleta and during recovery is when he began to feel worsening pain right hip. Patient using cane for assistance. Denies numbness/tingling. Patient reports failure of conservative management including activity modification, oral analgesics. Following review of the risks and benefits of the procedure, patient presents for elective right total hip replacement with Dr. Archuleta.  (05 Oct 2020 11:58)    Seen POD 1. Pain is controlled but c/o withdrawal symptoms. Utox positive for polysubstance use and patient is unable to quantify what and how much he takes. Admits to taking Xanax, methadone, THC, cocaine but states it all comes in a vial and he did not know the consequences of taking them all. C/o sweats, agitation, restlessness, inability to sleep.     In terms of DM, has not been taking insulin and only on metformin.     REVIEW OF SYSTEMS otherwise negative      General:	    Skin/Breast:  	  Ophthalmologic:  	  ENMT:	    Respiratory and Thorax:  	  Cardiovascular:	    Gastrointestinal:	    Genitourinary:	    Musculoskeletal:	    Neurological:	    Psychiatric:	    Hematology/Lymphatics:	    Endocrine:	    Allergic/Immunologic:	    Allergies    No Known Drug Allergies  shellfish (Hives)    Intolerances        Home Medications:  aspirin 81 mg oral tablet: orally once a day (13 Oct 2020 06:31)  atorvastatin 40 mg oral tablet: 1 tab(s) orally once a day (13 Oct 2020 06:31)  Chantix 1 mg oral tablet: 1 tab(s) orally 2 times a day (13 Oct 2020 06:31)  Lantus 100 units/mL subcutaneous solution: 20units subq (13 Oct 2020 06:31)  lisinopril 20 mg oral tablet: 1 tab(s) orally once a day (13 Oct 2020 06:31)      PAST MEDICAL & SURGICAL HISTORY:  Prostate cancer    HTN (hypertension)    Osteoarthritis  right hip  work related injury    Hyperlipidemia, unspecified hyperlipidemia type    Type 2 diabetes mellitus without complication, unspecified long term insulin use status    H/O total knee replacement, left  Dec. 2017    Surgery, elective  left knee meniscus    Surgery, elective  prostatectomy,  2013        FAMILY HISTORY:  Family history of chronic ischemic heart disease (Mother)    Family history of diabetes mellitus (Father)        SOCIAL HISTORY: polysubstance abuse for years including: opiates, methadone, xanax, THC, cocaine      Vital Signs Last 24 Hrs  T(C): 36.8 (14 Oct 2020 08:40), Max: 37.4 (13 Oct 2020 16:00)  T(F): 98.3 (14 Oct 2020 08:40), Max: 99.3 (13 Oct 2020 16:00)  HR: 88 (14 Oct 2020 08:40) (64 - 88)  BP: 123/76 (14 Oct 2020 08:40) (110/66 - 178/74)  BP(mean): 102 (13 Oct 2020 13:35) (102 - 111)  RR: 16 (14 Oct 2020 08:40) (16 - 21)  SpO2: 99% (14 Oct 2020 08:40) (96% - 100%)   I&O's Detail    13 Oct 2020 07:01  -  14 Oct 2020 07:00  --------------------------------------------------------  IN:    Lactated Ringers: 120 mL    Oral Fluid: 480 mL  Total IN: 600 mL    OUT:    Voided (mL): 1650 mL  Total OUT: 1650 mL    Total NET: -1050 mL        Daily     Daily     Physical Exam:   GEN: NAD, AAOx3, pleasant, piloerection on chest, diaphoretic  HEENT: MMM, no tongue fasciculations  CV: S1 S2 RRR, no MRG  Lung: CTAB  Abd: soft NT ND +BS  Ext: WWP, no hand tremors, R hip dressing tender but c/d/i  Neuro: no focal neuro deficit    MEDICATIONS  (STANDING):  acetaminophen   Tablet .. 975 milliGRAM(s) Oral every 8 hours  ALPRAZolam 1 milliGRAM(s) Oral every 12 hours  aspirin enteric coated 81 milliGRAM(s) Oral two times a day  atorvastatin 40 milliGRAM(s) Oral at bedtime  BUpivacaine liposome 1.3% Injectable (no eMAR) 20 milliLiter(s) Local Injection once  dextrose 5%. 1000 milliLiter(s) (50 mL/Hr) IV Continuous <Continuous>  dextrose 50% Injectable 12.5 Gram(s) IV Push once  dextrose 50% Injectable 25 Gram(s) IV Push once  dextrose 50% Injectable 25 Gram(s) IV Push once  influenza   Vaccine 0.5 milliLiter(s) IntraMuscular once  insulin glargine Injectable (LANTUS) 10 Unit(s) SubCutaneous at bedtime  insulin lispro (HumaLOG) corrective regimen sliding scale   SubCutaneous Before meals and at bedtime  ketorolac   Injectable 15 milliGRAM(s) IV Push every 6 hours  lactated ringers. 1000 milliLiter(s) (120 mL/Hr) IV Continuous <Continuous>  lisinopril 20 milliGRAM(s) Oral daily  oxyCODONE  ER Tablet 10 milliGRAM(s) Oral every 12 hours  polyethylene glycol 3350 17 Gram(s) Oral daily    MEDICATIONS  (PRN):  aluminum hydroxide/magnesium hydroxide/simethicone Suspension 30 milliLiter(s) Oral four times a day PRN Indigestion  cyclobenzaprine 5 milliGRAM(s) Oral three times a day PRN Muscle Spasm  dextrose 40% Gel 15 Gram(s) Oral once PRN Blood Glucose LESS THAN 70 milliGRAM(s)/deciliter  glucagon  Injectable 1 milliGRAM(s) IntraMuscular once PRN Glucose LESS THAN 70 milligrams/deciliter  HYDROmorphone  Injectable 1 milliGRAM(s) IV Push every 2 hours PRN breakthrough pain  magnesium hydroxide Suspension 30 milliLiter(s) Oral daily PRN Constipation  ondansetron Injectable 4 milliGRAM(s) IV Push every 6 hours PRN Nausea and/or Vomiting  oxyCODONE    IR 10 milliGRAM(s) Oral every 4 hours PRN Moderate Pain (4 - 6)  oxyCODONE    IR 15 milliGRAM(s) Oral every 4 hours PRN Severe Pain (7 - 10)  senna 2 Tablet(s) Oral at bedtime PRN Constipation  zolpidem 5 milliGRAM(s) Oral at bedtime PRN Insomnia                LABS:                        9.8    8.08  )-----------( 227      ( 14 Oct 2020 06:15 )             30.6     10-14    138  |  102  |  18  ----------------------------<  243<H>  4.0   |  24  |  1.15    Ca    8.4      14 Oct 2020 06:15          PT/INR - ( 14 Oct 2020 06:15 )   PT: 13.3 sec;   INR: 1.11          PTT - ( 14 Oct 2020 06:15 )  PTT:26.4 sec  CAPILLARY BLOOD GLUCOSE      POCT Blood Glucose.: 222 mg/dL (14 Oct 2020 12:07)  POCT Blood Glucose.: 241 mg/dL (14 Oct 2020 07:03)  POCT Blood Glucose.: 194 mg/dL (13 Oct 2020 20:56)  POCT Blood Glucose.: 222 mg/dL (13 Oct 2020 17:29)      RADIOLOGY & ADDITIONAL STUDIES:

## 2020-10-14 NOTE — PROGRESS NOTE ADULT - SUBJECTIVE AND OBJECTIVE BOX
Pain Management Progress Note - [facility] [phone]      HPI: Patient seen and examined today. Patient with a history of hypertension, osteoarthritis, hyperlipidemia, Type 2 diabetes, prostate CA, s/p R THR. Patient reports R hip pain and surgical site pain, pain managed with current pain medication regimen. Patient Axox3, denies n,v, no s/s of oversedation. Patient reports good apetite today, tolerating PT.  Reviewed pain medication regimen with patient at bedside. Patient reports eagerness to attend [facility] on [address] in [facility] upon discharge.       Pain is _x__ sharp ____dull ___burning x___achy ___ Intensity: ____ mild _x__mod ___severe     Location _x___surgical site ____cervical _____lumbar ____abd ____upper ext_x___R lower ext    Worse with ____activity _x___movement _____physical therapy___ Rest    Improved with _x___medication _x___rest ____physical therapy      ALPRAZolam  zolpidem  oxyCODONE  ER Tablet  cyclobenzaprine  HYDROmorphone  Injectable  oxyCODONE    IR  oxyCODONE    IR  acetaminophen   Tablet ..  ketorolac   Injectable  ketorolac   Injectable  BUpivacaine liposome 1.3% Injectable (no eMAR)  HYDROmorphone  Injectable  glucagon  Injectable  dextrose 50% Injectable  dextrose 50% Injectable  dextrose 50% Injectable  dextrose 40% Gel  dextrose 5%.  insulin lispro (HumaLOG) corrective regimen sliding scale  insulin glargine Injectable (LANTUS)  magnesium hydroxide Suspension  senna  bisacodyl Suppository  polyethylene glycol 3350  ondansetron Injectable  aluminum hydroxide/magnesium hydroxide/simethicone Suspension  ceFAZolin   IVPB  HYDROmorphone  Injectable  oxyCODONE    IR  oxyCODONE    IR  acetaminophen   Tablet ..  acetaminophen   Tablet ..  aspirin enteric coated  lactated ringers.  atorvastatin  lisinopril  oxyCODONE  ER Tablet  celecoxib  chlorhexidine 2% Cloths  povidone iodine 5% Nasal Swab  influenza   Vaccine      ROS: Const:  _-__febrile   Eyes:___ENT:___CV: _-__chest pain  Resp: __-__sob  GI:_-__nausea _-__vomiting __-_abd pain ___npo ___clears _x_full diet __bm  :___ [place]: _x__pain _x__spasm  Skin:___ Neuro:  _-__yyjuvdci__-_qiihxgqgm__-_ numbness _x__weakness _-__paresth  Psych:_-_anxiety  Endo:___ Heme:___Allergy:_________, _x__all others reviewed and negative      PAST MEDICAL & SURGICAL HISTORY:  Prostate cancer  HTN (hypertension)  Osteoarthritis  right hip  work related injury  Hyperlipidemia, unspecified hyperlipidemia type  Type 2 diabetes mellitus without complication, unspecified long term insulin use status  H/O total knee replacement, left  Dec. 2017  Surgery, elective  left knee meniscus  Surgery, elective  prostatectomy,  2013      10-14 @ 06:1572 mL/min/1.73M2      Hemoglobin: 9.8 g/dL (10-14 @ 06:15)      T(C): 36.8 (10-14-20 @ 08:40), Max: 37.4 (10-13-20 @ 16:00)  HR: 88 (10-14-20 @ 08:40) (68 - 88)  BP: 123/76 (10-14-20 @ 08:40) (110/66 - 166/71)  RR: 16 (10-14-20 @ 08:40) (16 - 20)  SpO2: 99% (10-14-20 @ 08:40) (96% - 100%)  Wt(kg): --       PHYSICAL EXAM:  Gen Appearance: x___no acute distress _x__appropriate        Neuro: _x__SILT feet____ EOM Intact Psych: AAOX_3_, _x__mood/affect appropriate        Eyes: _x__conjunctiva WNL  __x___ Pupils equal and round        ENT: _x__ears and nose atraumatic_x__ Hearing grossly intact        Neck: x___trachea midline, no visible masses ___thyroid without palpable mass    Resp: x___Nml WOB____No tactile fremitus ___clear to auscultation    Cardio: _x__extremities free from edema _x___pedal pulses palpable    GI/Abdomen: _x__soft __x___ Nontender____x__Nondistended_____HSM    Lymphatic: ___no palpable nodes in neck  ___no palpable nodes calves and feet    Skin/Wound: ___Incision, x___Dressing c/d/i,   ____surrounding tissues soft,  ___drain/chest tube present____    Muscular: EHL _5__/5  Gastrocnemius__5_/5    ___absent clubbing/cyanosis          ASSESSMENT: This is a 55y old Male with a history of hypertension, osteoarthritis, hyperlipidemia, Type 2 diabetes, prostate CA, s/p R THR.       Recommended Treatment PLAN:  1. Recommend substance abuse consult  2. Recommend be discharged with close followup of a substance abuse center  3. Oxycodone 10-15mg Po Q4h prn moderate to severe pain  4. Dilaudid 1mg Q1h IVP prn breakthrough pain   5. Flexeril 5mg Po Q8h prn muscle spasms  6.  tylenol 975mg PO Q8  7. oxycontin 10mg Po BID  Plan formed by Dr. Delgado

## 2020-10-14 NOTE — PROGRESS NOTE ADULT - SUBJECTIVE AND OBJECTIVE BOX
Pt comfortable without complaints, pain controlled. States he did not get enough pain medication yesterday immediately post-op, but currently feels great.  Denies CP, SOB, N/V, numbness/tingling     Vital Signs Last 24 Hrs  T(C): 37.3 (10-14-20 @ 05:02), Max: 37.3 (10-14-20 @ 05:02)  T(F): 99.2 (10-14-20 @ 05:02), Max: 99.2 (10-14-20 @ 05:02)  HR: 85 (10-14-20 @ 05:02) (80 - 85)  BP: 130/77 (10-14-20 @ 05:02) (122/77 - 130/77)  BP(mean): --  RR: 16 (10-14-20 @ 05:02) (16 - 17)  SpO2: 99% (10-14-20 @ 05:02) (96% - 99%)  AVSS    General: Pt Alert and oriented, NAD  Aquacel DSG C/D/I  Pulses: 2+  Sensation: SILT distally, notes mild numbness/burning over incision site  Motor: EHL/FHL/TA/GS/Quad/Psoas 5/5    Post-op X-Ray: Hardware intact without fractures    A/P: 55yMale POD#1 s/p R Anterior MICHELLE by Dr. Archuleta on 10/13  - Stable  - Pain Control, f/u pain mgmt recs  - DVT ppx: ASA  - Post op abx: Ancef  - PT, WBS: WBAT  - PT recommending Home PT  - PM recommending substance abuse consult today for grossly positive UTox    Ortho Pager [phone]

## 2020-10-14 NOTE — DISCHARGE NOTE PROVIDER - NSDCFUADDAPPT_GEN_ALL_CORE_FT
Please follow-up a rehabilitation and recovery center within the next few days, as you have already planned.

## 2020-10-14 NOTE — BEHAVIORAL HEALTH ASSESSMENT NOTE - HPI (INCLUDE ILLNESS QUALITY, SEVERITY, DURATION, TIMING, CONTEXT, MODIFYING FACTORS, ASSOCIATED SIGNS AND SYMPTOMS)
Patient is a 56y/o man with history of polysubstance abuse admitted to ortho, POD#1 s/p R Anterior MICHELLE on 10/13, ready for discharge. Psychiatry was consulted to evaluate pt for substance use and provide referral information for outpatient treatment. Per chart review, patient reported to the primary team that he has been abusing opiates (oxycodone/ heroin/methadone), xanax THC prior to admission (utox positive). While inpatient patient reported opioid withdrawal symptoms.   Patient was seen at bedside. He presents calm, pleasant cooperative, without any sx of withdrawal. He states that he feels very motivated to restart attending [facility] States that he has been working with the same counselor for several months but stopped attending it 2 months ago. Patient admits that he has been abusing pain medications and other substances since his knee surgery, 1 year ago. He reports taking 4 tabs of oxycodone per day. He minimizes the use of other substances and denies to the CL team abusing benzos or Patient is a 54y/o man with history of polysubstance abuse admitted to ortho, POD#1 s/p R Anterior MICHELLE on 10/13, ready for discharge. Psychiatry was consulted to evaluate pt for substance use and provide referral information for outpatient treatment. Per chart review, patient reported to the primary team that he has been abusing opiates (oxycodone/ heroin/methadone), xanax THC prior to admission (utox positive). While inpatient patient reported opioid withdrawal symptoms.   Patient was seen at bedside. He presents calm, pleasant cooperative, without any sx of withdrawal. He states that he feels very motivated to restart attending his [facility] States that he has been working with the same counselor for several months but stopped attending it 2 months ago. Patient admits that he has been abusing pain medications and other substances since his knee surgery, 1 year ago. He reports taking 4 tabs of oxycodone per day. He minimizes the use of other substances and denies to the CL team abusing benzos or the cocaine observed in the utox. Admits xanax and ambien abuse in the past. He denies any mood symptoms/psychosis/SI/HI. Reports being recently preoccupied with receiving his payment from [employer] however he states that he can cope well with his level of stress.

## 2020-10-14 NOTE — BEHAVIORAL HEALTH ASSESSMENT NOTE - SUMMARY
56y/o man with history of polysubstance abuse admitted to ortho, POD#1 s/p R Anterior MICHELLE on 10/13, ready for discharge. Psychiatry was consulted to evaluate pt for substance use and provide referral information for outpatient treatment. Per chart review, patient reported to the primary team that he has been abusing opiates (oxycodone/ heroin/methadone), xanax THC prior to admission (utox positive). While inpatient patient reported opioid withdrawal symptoms.     Patient currently admits opioid abuse, minimizes other substance use but overall states being motivated to return to his substance abuse program. He does not appear to have withdrawal sx at this time. He denies any moodsx/psychosis/SI/HI.     Plan:  -please discontinue ambien; patient has history of abusing ambien   -psychoeducation provided  -referral information for substance abuse programs (including [facility] provided)

## 2020-10-14 NOTE — DISCHARGE NOTE PROVIDER - HOSPITAL COURSE
Admitted  Surgery  Internal Medicine Consult/ Psych- Substance Abuse consult  Marlena-op Antibiotics  Pain control  DVT prophylaxis  OOB/Physical Therapy

## 2020-10-14 NOTE — DISCHARGE NOTE PROVIDER - CARE PROVIDER_API CALL
Po Archuleta  ORTHOPAEDIC SURGERY  [address] [place] [address], [address]  [place] [address]  Phone: [phone]  Fax: [phone]  Follow Up Time: 2 weeks

## 2020-10-14 NOTE — DISCHARGE NOTE PROVIDER - NSDCACTIVITY_GEN_ALL_CORE
No heavy lifting/straining/Showering allowed/Walking - Indoors allowed/Do not drive or operate machinery

## 2020-10-14 NOTE — BEHAVIORAL HEALTH ASSESSMENT NOTE - NSBHSOCIALHXDETAILSFT_PSY_A_CORE
Patient/Caregiver provided printed discharge information.
lives with wife; currently studying to get his master degree  worked in construction

## 2020-10-14 NOTE — BEHAVIORAL HEALTH ASSESSMENT NOTE - SUICIDE PROTECTIVE FACTORS
Identifies reasons for living/Has future plans/Cultural, spiritual and/or moral attitudes against suicide/Responsibility to family and others/Supportive social network of family or friends/Fear of death or the actual act of killing self

## 2020-10-14 NOTE — DISCHARGE NOTE PROVIDER - NSDCHHNEEDSERVICE_GEN_ALL_CORE
Teaching and training/Medication teaching and assessment/Wound care and assessment/Rehabilitation services/Observation and assessment

## 2020-10-14 NOTE — DISCHARGE NOTE NURSING/CASE MANAGEMENT/SOCIAL WORK - PATIENT PORTAL LINK FT
You can access the FollowMyHealth Patient Portal offered by [facility] by registering at the following website: [email or web address]. By joining [facility]’s FollowMyHealth portal, you will also be able to view your health information using other applications (apps) compatible with our system.

## 2020-10-14 NOTE — DISCHARGE NOTE PROVIDER - NSDCMRMEDTOKEN_GEN_ALL_CORE_FT
acetaminophen 325 mg oral tablet: 3 tab(s) orally every 8 hours  ALPRAZolam 1 mg oral tablet: 1 tab(s) orally every 12 hours, as needed for signs and symptoms of withdrawal MDD:2  Chantix 1 mg oral tablet: 1 tab(s) orally 2 times a day  lisinopril 20 mg oral tablet: 1 tab(s) orally once a day  Narcan 4 mg/0.1 mL nasal spray: 4 milligram(s) intranasally once a day.    Follow as per instructions given to you by pharmacist.  oxyCODONE 10 mg oral tablet: 1- 1.5 tab(s) orally every 4 hours, As needed, Moderate to Severe Pain MDD:6  oxyCODONE 10 mg oral tablet, extended release: 1 tab(s) orally every 12 hours, as needed for extended pain relief MDD:2   acetaminophen 325 mg oral tablet: 3 tab(s) orally every 8 hours  Adult Aspirin 325 mg oral tablet: 1 tab(s) orally once a day MDD:1  ALPRAZolam 1 mg oral tablet: 1 tab(s) orally every 12 hours, as needed for signs and symptoms of withdrawal MDD:2  Chantix 1 mg oral tablet: 1 tab(s) orally 2 times a day  lisinopril 20 mg oral tablet: 1 tab(s) orally once a day  Narcan 4 mg/0.1 mL nasal spray: 4 milligram(s) intranasally once a day.    Follow as per instructions given to you by pharmacist.  oxyCODONE 10 mg oral tablet: 1- 1.5 tab(s) orally every 4 hours, As needed, Moderate to Severe Pain MDD:6  oxyCODONE 10 mg oral tablet, extended release: 1 tab(s) orally every 12 hours, as needed for extended pain relief MDD:2

## 2020-10-14 NOTE — DISCHARGE NOTE PROVIDER - NSDCFUADDINST_GEN_ALL_CORE_FT
Weight bear as tolerated with assistive device.  No strenuous activity, heavy lifting, driving or returning to work until cleared by MD.  You may shower - dressing is water-resistant, no soaking in bathtubs.  Remove dressing after post op day 5-7, then leave incision open to air. Keep incision clean and dry.  Try to have regular bowel movements, take stool softener or laxative if necessary.  May take Pepcid or Zantac for upset stomach.    Swelling may travel all the way down leg to foot, this is normal and will subside in a few weeks.  Call to schedule an appt with Dr. Archuleta for follow up, if you have staples or sutures they will be removed in office.  Contact your doctor if you experience: fever greater than 101.5, chills, chest pain, difficulty breathing, redness or excessive drainage around the incision, other concerns.    Follow up with your primary care provider.

## 2020-10-14 NOTE — CONSULT NOTE ADULT - ASSESSMENT
55 year old M s/p R THR, medicine consulted for opioid withdrawal.     # Opioid and benzodiazepine withdrawal/Polysubstance abuse: patient is unsure of what drugs he takes and the doses, in terms of opioid withdrawal it appears he has a high tolerance and may be withdrawing even though he is on opioids here; the sweating/flushing may also be a side effect of the dilaudid; in terms of benzo withdrawal he admits to taking xanax but is unsure of the dose: his CIWA 3 for sweats and mild agitation, would start on low dose xanax q12h and adjust accordingly; he states he is committed to joining a program to help him wean off; in terms of home medications if he is discharged recommend discharging on opioids for pain and xanax to avoid benzo withdrawal. I explained that he can't abruptly discontinue the drugs he is taking at home because he will withdraw and he needs outpatient detox/psych to help him wean off. He stated his understanding. It is prudent to send him with narcan in case of overdose. c/w symptomatic care as well    # Post-op care: c/w pain control, c/w bowel regimen, c/w IS    # DM: A1C close to 9, on insulin at home but noncompliant, if he is staying would start basal/bolus regimen as AM FS high, otherwise resume metformin he was taking when discharged and f/u w/ endocrinology    #HTN: c/w ACEi    #Leukocytosis: reactive to surgery    #Anemia: acute blood loss from surgery, trend H/H    #HLD: c/w statin    #DVT ppx: ASA BID 55 year old M s/p R THR, medicine consulted for opioid withdrawal.     # Opioid and benzodiazepine withdrawal/Polysubstance abuse: patient is unsure of what drugs he takes and the doses, in terms of opioid withdrawal it appears he has a high tolerance and may be withdrawing even though he is on opioids here; the sweating/flushing may also be a side effect of the dilaudid; in terms of benzo withdrawal he admits to taking xanax but is unsure of the dose: his CIWA 3 for sweats and mild agitation, would start on low dose xanax q12h and adjust accordingly; he states he is committed to joining a program to help him wean off; in terms of home medications if he is discharged recommend discharging on opioids for pain and xanax to avoid benzo withdrawal. I explained that he can't abruptly discontinue the drugs he is taking at home because he will withdraw and he needs outpatient detox/psych to help him wean off. He stated his understanding. It is prudent to send him with narcan in case of overdose. c/w symptomatic care as well    # Post-op care: c/w pain control, c/w bowel regimen, c/w IS    # DM: A1C close to 9, on insulin at home but noncompliant, if he is staying would start basal/bolus regimen as AM FS high, otherwise resume metformin he was taking when discharged and f/u w/ endocrinology    #HTN: c/w ACEi    #Anemia: acute blood loss from surgery, trend H/H    #HLD: c/w statin    #DVT ppx: ASA BID

## 2020-10-14 NOTE — DISCHARGE NOTE PROVIDER - NSDCCPCAREPLAN_GEN_ALL_CORE_FT
PRINCIPAL DISCHARGE DIAGNOSIS  Diagnosis: Primary osteoarthritis of right hip  Assessment and Plan of Treatment: Primary osteoarthritis of right hip

## 2020-10-19 DIAGNOSIS — D62 ACUTE POSTHEMORRHAGIC ANEMIA: ICD-10-CM

## 2020-10-19 DIAGNOSIS — Z79.82 LONG TERM (CURRENT) USE OF ASPIRIN: ICD-10-CM

## 2020-10-19 DIAGNOSIS — E78.5 HYPERLIPIDEMIA, UNSPECIFIED: ICD-10-CM

## 2020-10-19 DIAGNOSIS — Z85.46 PERSONAL HISTORY OF MALIGNANT NEOPLASM OF PROSTATE: ICD-10-CM

## 2020-10-19 DIAGNOSIS — Z79.4 LONG TERM (CURRENT) USE OF INSULIN: ICD-10-CM

## 2020-10-19 DIAGNOSIS — I10 ESSENTIAL (PRIMARY) HYPERTENSION: ICD-10-CM

## 2020-10-19 DIAGNOSIS — F12.10 CANNABIS ABUSE, UNCOMPLICATED: ICD-10-CM

## 2020-10-19 DIAGNOSIS — F13.10 SEDATIVE, HYPNOTIC OR ANXIOLYTIC ABUSE, UNCOMPLICATED: ICD-10-CM

## 2020-10-19 DIAGNOSIS — F11.20 OPIOID DEPENDENCE, UNCOMPLICATED: ICD-10-CM

## 2020-10-19 DIAGNOSIS — M16.11 UNILATERAL PRIMARY OSTEOARTHRITIS, RIGHT HIP: ICD-10-CM

## 2020-10-19 DIAGNOSIS — Z91.14 PATIENT'S OTHER NONCOMPLIANCE WITH MEDICATION REGIMEN: ICD-10-CM

## 2020-10-19 DIAGNOSIS — Z96.652 PRESENCE OF LEFT ARTIFICIAL KNEE JOINT: ICD-10-CM

## 2020-10-19 DIAGNOSIS — F14.10 COCAINE ABUSE, UNCOMPLICATED: ICD-10-CM

## 2020-10-19 DIAGNOSIS — Z91.013 ALLERGY TO SEAFOOD: ICD-10-CM

## 2020-10-19 DIAGNOSIS — E11.9 TYPE 2 DIABETES MELLITUS WITHOUT COMPLICATIONS: ICD-10-CM

## 2020-10-29 PROCEDURE — 80307 DRUG TEST PRSMV CHEM ANLYZR: CPT

## 2020-10-29 PROCEDURE — 97116 GAIT TRAINING THERAPY: CPT

## 2020-10-29 PROCEDURE — 85027 COMPLETE CBC AUTOMATED: CPT

## 2020-10-29 PROCEDURE — C1713: CPT

## 2020-10-29 PROCEDURE — C1769: CPT

## 2020-10-29 PROCEDURE — 85730 THROMBOPLASTIN TIME PARTIAL: CPT

## 2020-10-29 PROCEDURE — C1776: CPT

## 2020-10-29 PROCEDURE — 72170 X-RAY EXAM OF PELVIS: CPT

## 2020-10-29 PROCEDURE — 36415 COLL VENOUS BLD VENIPUNCTURE: CPT

## 2020-10-29 PROCEDURE — 97161 PT EVAL LOW COMPLEX 20 MIN: CPT

## 2020-10-29 PROCEDURE — 97530 THERAPEUTIC ACTIVITIES: CPT

## 2020-10-29 PROCEDURE — 82962 GLUCOSE BLOOD TEST: CPT

## 2020-10-29 PROCEDURE — 76000 FLUOROSCOPY <1 HR PHYS/QHP: CPT

## 2020-10-29 PROCEDURE — 83036 HEMOGLOBIN GLYCOSYLATED A1C: CPT

## 2020-10-29 PROCEDURE — 85610 PROTHROMBIN TIME: CPT

## 2020-10-29 PROCEDURE — 80048 BASIC METABOLIC PNL TOTAL CA: CPT

## 2021-07-13 NOTE — H&P ADULT - NSHPSOCIALHISTORY_GEN_ALL_CORE
Dr. Carine Li in to see pt.       Carrie River, YESICA  07/13/21 [id number] Smokes, socially drinks, no illicit drug use

## 2021-08-13 ENCOUNTER — HOSPITAL ENCOUNTER (INPATIENT)
Dept: HOSPITAL 74 - YASAS | Age: 56
LOS: 2 days | Discharge: LEFT BEFORE DISCHARGE | DRG: 770 | End: 2021-08-15
Attending: ALLERGY & IMMUNOLOGY | Admitting: ALLERGY & IMMUNOLOGY
Payer: COMMERCIAL

## 2021-08-13 VITALS — BODY MASS INDEX: 27.6 KG/M2

## 2021-08-13 DIAGNOSIS — M10.9: ICD-10-CM

## 2021-08-13 DIAGNOSIS — Z79.84: ICD-10-CM

## 2021-08-13 DIAGNOSIS — F10.20: ICD-10-CM

## 2021-08-13 DIAGNOSIS — Z96.652: ICD-10-CM

## 2021-08-13 DIAGNOSIS — I10: ICD-10-CM

## 2021-08-13 DIAGNOSIS — Z87.891: ICD-10-CM

## 2021-08-13 DIAGNOSIS — Z96.641: ICD-10-CM

## 2021-08-13 DIAGNOSIS — E78.5: ICD-10-CM

## 2021-08-13 DIAGNOSIS — E11.9: ICD-10-CM

## 2021-08-13 DIAGNOSIS — F11.20: Primary | ICD-10-CM

## 2021-08-13 PROCEDURE — U0003 INFECTIOUS AGENT DETECTION BY NUCLEIC ACID (DNA OR RNA); SEVERE ACUTE RESPIRATORY SYNDROME CORONAVIRUS 2 (SARS-COV-2) (CORONAVIRUS DISEASE [COVID-19]), AMPLIFIED PROBE TECHNIQUE, MAKING USE OF HIGH THROUGHPUT TECHNOLOGIES AS DESCRIBED BY CMS-2020-01-R: HCPCS

## 2021-08-13 PROCEDURE — C9803 HOPD COVID-19 SPEC COLLECT: HCPCS

## 2021-08-13 PROCEDURE — U0005 INFEC AGEN DETEC AMPLI PROBE: HCPCS

## 2021-08-14 VITALS — HEART RATE: 92 BPM | DIASTOLIC BLOOD PRESSURE: 67 MMHG | SYSTOLIC BLOOD PRESSURE: 124 MMHG

## 2021-08-14 VITALS — TEMPERATURE: 97.2 F

## 2021-08-14 PROCEDURE — HZ42ZZZ GROUP COUNSELING FOR SUBSTANCE ABUSE TREATMENT, COGNITIVE-BEHAVIORAL: ICD-10-PCS | Performed by: ALLERGY & IMMUNOLOGY

## 2021-08-14 RX ADMIN — METFORMIN HYDROCHLORIDE SCH MG: 500 TABLET ORAL at 17:07

## 2021-08-14 RX ADMIN — Medication SCH MG: at 21:41

## 2021-08-14 RX ADMIN — HYDROXYZINE PAMOATE SCH MG: 25 CAPSULE ORAL at 17:07

## 2021-08-14 RX ADMIN — HYDROXYZINE PAMOATE SCH MG: 25 CAPSULE ORAL at 06:40

## 2021-08-14 RX ADMIN — METFORMIN HYDROCHLORIDE SCH MG: 500 TABLET ORAL at 06:40

## 2021-08-14 RX ADMIN — HYDROXYZINE PAMOATE SCH MG: 25 CAPSULE ORAL at 14:24

## 2021-08-14 RX ADMIN — HYDROXYZINE PAMOATE SCH MG: 25 CAPSULE ORAL at 21:41

## 2021-08-14 RX ADMIN — HYDROXYZINE PAMOATE SCH MG: 25 CAPSULE ORAL at 09:57

## 2021-08-14 RX ADMIN — Medication SCH MG: at 01:30

## 2022-11-04 PROBLEM — C61 MALIGNANT NEOPLASM OF PROSTATE: Chronic | Status: ACTIVE | Noted: 2020-10-12

## 2022-11-04 PROBLEM — I10 ESSENTIAL (PRIMARY) HYPERTENSION: Chronic | Status: ACTIVE | Noted: 2020-10-12

## 2022-11-04 PROBLEM — M19.90 UNSPECIFIED OSTEOARTHRITIS, UNSPECIFIED SITE: Chronic | Status: ACTIVE | Noted: 2020-10-05

## 2022-11-29 NOTE — PRE-OP CHECKLIST - HEIGHT IN INCHES
6 Rhomboid Transposition Flap Text: The defect edges were debeveled with a #15 scalpel blade.  Given the location of the defect and the proximity to free margins a rhomboid transposition flap was deemed most appropriate.  Using a sterile surgical marker, an appropriate rhomboid flap was drawn incorporating the defect.    The area thus outlined was incised deep to adipose tissue with a #15 scalpel blade.  The skin margins were undermined to an appropriate distance in all directions utilizing iris scissors.

## 2023-11-29 NOTE — PATIENT PROFILE ADULT. - REASON FOR ADMISSION
Patient to ED reporting hives to the back earlier today. Gave benadryl and says it helped with the hives but is unsure of what caused it. No hives in triage. Left Knee Manipulation